# Patient Record
Sex: MALE | Race: WHITE | NOT HISPANIC OR LATINO | Employment: FULL TIME | URBAN - METROPOLITAN AREA
[De-identification: names, ages, dates, MRNs, and addresses within clinical notes are randomized per-mention and may not be internally consistent; named-entity substitution may affect disease eponyms.]

---

## 2019-03-07 ENCOUNTER — OFFICE VISIT (OUTPATIENT)
Dept: GASTROENTEROLOGY | Facility: CLINIC | Age: 29
End: 2019-03-07
Payer: COMMERCIAL

## 2019-03-07 VITALS
HEART RATE: 72 BPM | BODY MASS INDEX: 30.81 KG/M2 | DIASTOLIC BLOOD PRESSURE: 82 MMHG | WEIGHT: 208 LBS | SYSTOLIC BLOOD PRESSURE: 126 MMHG | HEIGHT: 69 IN

## 2019-03-07 DIAGNOSIS — R10.13 EPIGASTRIC PAIN: ICD-10-CM

## 2019-03-07 DIAGNOSIS — K21.9 GASTROESOPHAGEAL REFLUX DISEASE, ESOPHAGITIS PRESENCE NOT SPECIFIED: Primary | ICD-10-CM

## 2019-03-07 DIAGNOSIS — R00.2 PALPITATIONS: ICD-10-CM

## 2019-03-07 PROBLEM — N20.0 KIDNEY STONES: Status: ACTIVE | Noted: 2019-03-07

## 2019-03-07 PROCEDURE — 99243 OFF/OP CNSLTJ NEW/EST LOW 30: CPT | Performed by: INTERNAL MEDICINE

## 2019-03-07 RX ORDER — FAMOTIDINE 10 MG
10 TABLET ORAL AS NEEDED
COMMUNITY
End: 2020-10-12

## 2019-03-07 RX ORDER — PANTOPRAZOLE SODIUM 40 MG/1
40 TABLET, DELAYED RELEASE ORAL DAILY
Qty: 30 TABLET | Refills: 12 | Status: SHIPPED | OUTPATIENT
Start: 2019-03-07 | End: 2020-10-12

## 2019-03-07 NOTE — LETTER
March 7, 2019     Jun Olivo MD  [address] [address]  [address]    Patient: Margo Nunez   YOB: 1990   Date of Visit: 3/7/2019       Dear Dr Peres Stage:    Thank you for referring Margo Nunez to me for evaluation  Below are my notes for this consultation  If you have questions, please do not hesitate to call me  I look forward to following your patient along with you  Sincerely,        Saturnino Viramontes MD        CC: No Recipients  Saturnino Viramontes MD  3/7/2019  5:54 PM  Sign at close encounter    [facility] - Outpatient Consultation  Margo Nunez 25 y o  male MRN: [id number]  Encounter: [id number]    ASSESSMENT AND PLAN:      1  Gastroesophageal reflux disease, esophagitis presence not specified  Patient with longstanding symptoms of fairly classic gastroesophageal reflux  He has feeling of burning sensation coming up from the subxiphoid to the chest   This has been going on quite some time but much worse in the last year so  He does not have dysphagia or alarm symptoms    - pantoprazole (PROTONIX) 40 mg tablet; Take 1 tablet (40 mg total) by mouth daily for 30 days  Dispense: 30 tablet; Refill: 12  --anti reflux diet  --EGD at [facility]   -will do this to rule out Quezada's esophagus    2  Palpitations  -patient has seen cardiology recently  His primary physician thought perhaps this was related to the reflux  In any event recent cardiac workup was negative he had a negative echocardiogram    3  Epigastric pain  Patient does report intermittent left upper quadrant pain and epigastric pain  This seems to be related to problem 1   It is not aggravated by eating      Followup Appointment[de-identified]  4 months  ______________________________________________________________________    Chief Complaint   Patient presents with    Heartburn    Bloated       HPI:   Margo Nunez is a 25y o  year old male who presents longstanding issues acid indigestion and upper abdominal discomfort  Patient reports that he has had some issues over may knee years but these seem to have escalated in the last year so  He was seen in the office by his personal physician over-the-counter Nexium 22 mg was recommended  While he was on this medication he did not really have much in the way of any problems  When he went off the medication the symptoms returned  He denies any dysphagia  Hector Foster He does have a sense of burning in the upper abdomen and chest several times a week  He also has abdominal bloating and some left upper quadrant discomfort as well  This is not associated with eating necessarily  Spicy foods seem to aggravate the symptoms  Fast foods or greasy foods will do the same  He drinks about 1 cup of coffee per day  He will get partial relief of his GERD like symptoms when he takes over-the-counter Pepcid      Past medical history-kidney stones  Past surgical history-lithotripsy      Social History     Substance and Sexual Activity   Alcohol Use Not Currently     Social History     Substance and Sexual Activity   Drug Use Never     Social History     Tobacco Use   Smoking Status Former Smoker    Last attempt to quit: 2017    Years since quittin 1   Smokeless Tobacco Never Used     Family History   Problem Relation Age of Onset    Hypertension Mother     Diabetes Father     Hypertension Father     Colon cancer Neg Hx     Colon polyps Neg Hx        Meds/Allergies     Current Outpatient Medications:     famotidine (PEPCID) 10 mg tablet    pantoprazole (PROTONIX) 40 mg tablet    Allergies   Allergen Reactions    Sulfa Antibiotics Rash       PHYSICAL EXAM:    Blood pressure 126/82, pulse 72, height 5' 9" (1 753 m), weight 94 3 kg (208 lb)  Body mass index is 30 72 kg/m²  General Appearance: NAD, cooperative, alert  HEENT:  Normocephalic, atraumatic, anicteric      Neck:  Supple, symmetrical, trachea midline  Lungs:  Clear to auscultation bilaterally; no rales, rhonchi or wheezing; respirations unlabored   Heart[de-identified]  Regular rate and rhythm; no murmur, rub, or gallop  Abdomen:  Soft, non-tender, non-distended; normal bowel sounds; no masses, no organomegaly   Rectal:  Deferred   Extremities: No cyanosis, clubbing or edema   Skin:  No jaundice, rashes, or lesions   Lymph nodes: No palpable cervical lymphadenopathy  Psych: Normal affect, good eye contact  Neuro: No gross deficits, AAOx3    Lab Results:     No results found for: LIPASE    Radiology Results:   No results found  REVIEW OF SYSTEMS:    CONSTITUTIONAL: Denies any fever, chills, rigors, and weight loss  HEENT: No earache or tinnitus  Denies hearing loss or visual disturbances  CARDIOVASCULAR: No chest pain positive for palpitation   RESPIRATORY: Denies any cough, hemoptysis, shortness of breath or dyspnea on exertion  GASTROINTESTINAL: As noted in the History of Present Illness  GENITOURINARY: No problems with urination  Denies any hematuria or dysuria  NEUROLOGIC: No dizziness or vertigo, denies headaches  MUSCULOSKELETAL: Denies any muscle or joint pain  SKIN: Denies skin rashes or itching  ENDOCRINE: Denies excessive thirst  Denies intolerance to heat or cold  PSYCHOSOCIAL: Denies depression or anxiety  Denies any recent memory loss

## 2019-03-07 NOTE — PATIENT INSTRUCTIONS
[facility] - Outpatient Consultation  Nina Newton 25 y o  male MRN: [id number]  Encounter: [id number]    ASSESSMENT AND PLAN:      1  Gastroesophageal reflux disease, esophagitis presence not specified  Patient with longstanding symptoms of fairly classic gastroesophageal reflux  He has feeling of burning sensation coming up from the subxiphoid to the chest   This has been going on quite some time but much worse in the last year so  He does not have dysphagia or alarm symptoms    - pantoprazole (PROTONIX) 40 mg tablet; Take 1 tablet (40 mg total) by mouth daily for 30 days  Dispense: 30 tablet; Refill: 12  --anti reflux diet  --EGD at [facility]   -will do this to rule out Quezada's esophagus    2  Palpitations  -patient has seen cardiology recently  His primary physician thought perhaps this was related to the reflux  In any event recent cardiac workup was negative he had a negative echocardiogram    3  Epigastric pain  Patient does report intermittent left upper quadrant pain and epigastric pain  This seems to be related to problem 1   It is not aggravated by eating      Followup Appointment[de-identified]  4 months

## 2019-03-07 NOTE — PROGRESS NOTES
[address] - Outpatient Consultation  Nicola Brandt 25 y o  male MRN: [id number]  Encounter: [id number]    ASSESSMENT AND PLAN:      1  Gastroesophageal reflux disease, esophagitis presence not specified  Patient with longstanding symptoms of fairly classic gastroesophageal reflux  He has feeling of burning sensation coming up from the subxiphoid to the chest   This has been going on quite some time but much worse in the last year so  He does not have dysphagia or alarm symptoms    - pantoprazole (PROTONIX) 40 mg tablet; Take 1 tablet (40 mg total) by mouth daily for 30 days  Dispense: 30 tablet; Refill: 12  --anti reflux diet  --EGD at [address] [place] [address]   -will do this to rule out Quezada's esophagus    2  Palpitations  -patient has seen cardiology recently  His primary physician thought perhaps this was related to the reflux  In any event recent cardiac workup was negative he had a negative echocardiogram    3  Epigastric pain  Patient does report intermittent left upper quadrant pain and epigastric pain  This seems to be related to problem 1  It is not aggravated by eating      Followup Appointment[de-identified]  4 months  ______________________________________________________________________    Chief Complaint   Patient presents with    Heartburn    Bloated       HPI:   Nicola Brandt is a 25y o  year old male who presents longstanding issues acid indigestion and upper abdominal discomfort  Patient reports that he has had some issues over may knee years but these seem to have escalated in the last year so  He was seen in the office by his personal physician over-the-counter Nexium 22 mg was recommended  While he was on this medication he did not really have much in the way of any problems  When he went off the medication the symptoms returned  He denies any dysphagia  Banegas Alvarado He does have a sense of burning in the upper abdomen and chest several times a week    He also has abdominal bloating and some left upper quadrant discomfort as well  This is not associated with eating necessarily  Spicy foods seem to aggravate the symptoms  Fast foods or greasy foods will do the same  He drinks about 1 cup of coffee per day  He will get partial relief of his GERD like symptoms when he takes over-the-counter Pepcid      Past medical history-kidney stones  Past surgical history-lithotripsy      Social History     Substance and Sexual Activity   Alcohol Use Not Currently     Social History     Substance and Sexual Activity   Drug Use Never     Social History     Tobacco Use   Smoking Status Former Smoker    Last attempt to quit:     Years since quittin 1   Smokeless Tobacco Never Used     Family History   Problem Relation Age of Onset    Hypertension Mother     Diabetes Father     Hypertension Father     Colon cancer Neg Hx     Colon polyps Neg Hx        Meds/Allergies     Current Outpatient Medications:     famotidine (PEPCID) 10 mg tablet    pantoprazole (PROTONIX) 40 mg tablet    Allergies   Allergen Reactions    Sulfa Antibiotics Rash       PHYSICAL EXAM:    Blood pressure 126/82, pulse 72, height 5' 9" (1 753 m), weight 94 3 kg (208 lb)  Body mass index is 30 72 kg/m²  General Appearance: NAD, cooperative, alert  HEENT:  Normocephalic, atraumatic, anicteric  Neck:  Supple, symmetrical, trachea midline  Lungs:  Clear to auscultation bilaterally; no rales, rhonchi or wheezing; respirations unlabored   Heart[de-identified]  Regular rate and rhythm; no murmur, rub, or gallop    Abdomen:  Soft, non-tender, non-distended; normal bowel sounds; no masses, no organomegaly   Rectal:  Deferred   Extremities: No cyanosis, clubbing or edema   Skin:  No jaundice, rashes, or lesions   Lymph nodes: No palpable cervical lymphadenopathy  Psych: Normal affect, good eye contact  Neuro: No gross deficits, AAOx3    Lab Results:     No results found for: LIPASE    Radiology Results:   No results found     REVIEW OF SYSTEMS:    CONSTITUTIONAL: Denies any fever, chills, rigors, and weight loss  HEENT: No earache or tinnitus  Denies hearing loss or visual disturbances  CARDIOVASCULAR: No chest pain positive for palpitation   RESPIRATORY: Denies any cough, hemoptysis, shortness of breath or dyspnea on exertion  GASTROINTESTINAL: As noted in the History of Present Illness  GENITOURINARY: No problems with urination  Denies any hematuria or dysuria  NEUROLOGIC: No dizziness or vertigo, denies headaches  MUSCULOSKELETAL: Denies any muscle or joint pain  SKIN: Denies skin rashes or itching  ENDOCRINE: Denies excessive thirst  Denies intolerance to heat or cold  PSYCHOSOCIAL: Denies depression or anxiety  Denies any recent memory loss

## 2019-03-12 ENCOUNTER — TELEPHONE (OUTPATIENT)
Dept: GASTROENTEROLOGY | Facility: CLINIC | Age: 29
End: 2019-03-12

## 2019-04-02 NOTE — TELEPHONE ENCOUNTER
Dr Elizabeth Zuniga has been contacted to schedule egd from office visit with no response   Please advise-thank you

## 2019-12-05 ENCOUNTER — OFFICE VISIT (OUTPATIENT)
Dept: URGENT CARE | Facility: CLINIC | Age: 29
End: 2019-12-05
Payer: COMMERCIAL

## 2019-12-05 VITALS
HEIGHT: 69 IN | SYSTOLIC BLOOD PRESSURE: 130 MMHG | OXYGEN SATURATION: 98 % | WEIGHT: 201 LBS | HEART RATE: 107 BPM | DIASTOLIC BLOOD PRESSURE: 92 MMHG | RESPIRATION RATE: 18 BRPM | TEMPERATURE: 101.6 F | BODY MASS INDEX: 29.77 KG/M2

## 2019-12-05 DIAGNOSIS — J11.1 INFLUENZA: Primary | ICD-10-CM

## 2019-12-05 PROCEDURE — 99213 OFFICE O/P EST LOW 20 MIN: CPT | Performed by: FAMILY MEDICINE

## 2019-12-05 RX ORDER — OSELTAMIVIR PHOSPHATE 75 MG/1
75 CAPSULE ORAL EVERY 12 HOURS SCHEDULED
Qty: 10 CAPSULE | Refills: 0 | Status: SHIPPED | OUTPATIENT
Start: 2019-12-05 | End: 2019-12-10

## 2019-12-05 RX ORDER — SACCHAROMYCES BOULARDII 250 MG
250 CAPSULE ORAL 2 TIMES DAILY
COMMUNITY
End: 2020-10-15

## 2019-12-05 NOTE — PATIENT INSTRUCTIONS
F/u as needed  Most likely viral   Supportive care  OTC meds as needed  Tamiflu dispensed  F/u with PCP if no improvement

## 2019-12-05 NOTE — LETTER
December 5, 2019     Patient: Tomeka Byrne   YOB: 1990   Date of Visit: 12/5/2019       To Whom it May Concern: Tomeka Byrne was seen in my clinic on 12/5/2019  He is excused from work 12/5-6/2019 due to illness  If you have any questions or concerns, please don't hesitate to call           Sincerely,          MARYCRUZ Funez        CC: No Recipients

## 2020-03-11 ENCOUNTER — TRANSCRIBE ORDERS (OUTPATIENT)
Dept: ADMINISTRATIVE | Facility: HOSPITAL | Age: 30
End: 2020-03-11

## 2020-03-11 DIAGNOSIS — R10.9 ABDOMINAL PAIN, UNSPECIFIED ABDOMINAL LOCATION: Primary | ICD-10-CM

## 2020-03-17 ENCOUNTER — HOSPITAL ENCOUNTER (OUTPATIENT)
Dept: ULTRASOUND IMAGING | Facility: HOSPITAL | Age: 30
Discharge: HOME | End: 2020-03-17
Payer: COMMERCIAL

## 2020-03-17 DIAGNOSIS — R10.9 ABDOMINAL PAIN, UNSPECIFIED ABDOMINAL LOCATION: ICD-10-CM

## 2020-03-17 PROCEDURE — 76857 US EXAM PELVIC LIMITED: CPT

## 2020-03-17 PROCEDURE — 76700 US EXAM ABDOM COMPLETE: CPT

## 2020-10-06 ENCOUNTER — OFFICE VISIT (OUTPATIENT)
Dept: URGENT CARE | Facility: CLINIC | Age: 30
End: 2020-10-06
Payer: COMMERCIAL

## 2020-10-06 VITALS
WEIGHT: 200 LBS | BODY MASS INDEX: 29.62 KG/M2 | RESPIRATION RATE: 16 BRPM | DIASTOLIC BLOOD PRESSURE: 91 MMHG | HEART RATE: 69 BPM | SYSTOLIC BLOOD PRESSURE: 157 MMHG | TEMPERATURE: 98.9 F | OXYGEN SATURATION: 99 % | HEIGHT: 69 IN

## 2020-10-06 DIAGNOSIS — K29.00 ACUTE GASTRITIS WITHOUT HEMORRHAGE, UNSPECIFIED GASTRITIS TYPE: Primary | ICD-10-CM

## 2020-10-06 PROCEDURE — 99213 OFFICE O/P EST LOW 20 MIN: CPT

## 2020-10-06 RX ORDER — OMEPRAZOLE 20 MG/1
20 CAPSULE, DELAYED RELEASE ORAL DAILY
COMMUNITY
End: 2020-10-21 | Stop reason: SDUPTHER

## 2020-10-06 RX ORDER — CALCIUM CARBONATE 200(500)MG
1 TABLET,CHEWABLE ORAL 3 TIMES DAILY
Qty: 30 TABLET | Refills: 0 | Status: SHIPPED | OUTPATIENT
Start: 2020-10-06 | End: 2020-10-15

## 2020-10-12 ENCOUNTER — HOSPITAL ENCOUNTER (EMERGENCY)
Facility: HOSPITAL | Age: 30
Discharge: HOME | End: 2020-10-12
Attending: EMERGENCY MEDICINE | Admitting: EMERGENCY MEDICINE
Payer: COMMERCIAL

## 2020-10-12 ENCOUNTER — APPOINTMENT (EMERGENCY)
Dept: CT IMAGING | Facility: HOSPITAL | Age: 30
End: 2020-10-12
Payer: COMMERCIAL

## 2020-10-12 ENCOUNTER — APPOINTMENT (EMERGENCY)
Dept: RADIOLOGY | Facility: HOSPITAL | Age: 30
End: 2020-10-12
Payer: COMMERCIAL

## 2020-10-12 VITALS
SYSTOLIC BLOOD PRESSURE: 130 MMHG | WEIGHT: 195 LBS | OXYGEN SATURATION: 97 % | DIASTOLIC BLOOD PRESSURE: 94 MMHG | TEMPERATURE: 97.7 F | HEART RATE: 65 BPM | BODY MASS INDEX: 28.88 KG/M2 | RESPIRATION RATE: 14 BRPM | HEIGHT: 69 IN

## 2020-10-12 DIAGNOSIS — M54.2 NECK PAIN: ICD-10-CM

## 2020-10-12 DIAGNOSIS — R07.9 CHEST PAIN: Primary | ICD-10-CM

## 2020-10-12 DIAGNOSIS — R51.9 HEADACHE: ICD-10-CM

## 2020-10-12 LAB
ALBUMIN SERPL BCP-MCNC: 4.2 G/DL (ref 3.5–5)
ALP SERPL-CCNC: 70 U/L (ref 46–116)
ALT SERPL W P-5'-P-CCNC: 28 U/L (ref 12–78)
ANION GAP SERPL CALCULATED.3IONS-SCNC: 6 MMOL/L (ref 4–13)
AST SERPL W P-5'-P-CCNC: 17 U/L (ref 5–45)
BASOPHILS # BLD AUTO: 0.03 THOUSANDS/ΜL (ref 0–0.1)
BASOPHILS NFR BLD AUTO: 0 % (ref 0–1)
BILIRUB SERPL-MCNC: 0.4 MG/DL (ref 0.2–1)
BUN SERPL-MCNC: 15 MG/DL (ref 5–25)
CALCIUM SERPL-MCNC: 9 MG/DL (ref 8.3–10.1)
CHLORIDE SERPL-SCNC: 102 MMOL/L (ref 100–108)
CO2 SERPL-SCNC: 29 MMOL/L (ref 21–32)
CREAT SERPL-MCNC: 1.17 MG/DL (ref 0.6–1.3)
EOSINOPHIL # BLD AUTO: 0.04 THOUSAND/ΜL (ref 0–0.61)
EOSINOPHIL NFR BLD AUTO: 1 % (ref 0–6)
ERYTHROCYTE [DISTWIDTH] IN BLOOD BY AUTOMATED COUNT: 11.7 % (ref 11.6–15.1)
GFR SERPL CREATININE-BSD FRML MDRD: 82 ML/MIN/1.73SQ M
GLUCOSE SERPL-MCNC: 88 MG/DL (ref 65–140)
HCT VFR BLD AUTO: 44.3 % (ref 36.5–49.3)
HGB BLD-MCNC: 15.5 G/DL (ref 12–17)
IMM GRANULOCYTES # BLD AUTO: 0.01 THOUSAND/UL (ref 0–0.2)
IMM GRANULOCYTES NFR BLD AUTO: 0 % (ref 0–2)
LIPASE SERPL-CCNC: 148 U/L (ref 73–393)
LYMPHOCYTES # BLD AUTO: 2.85 THOUSANDS/ΜL (ref 0.6–4.47)
LYMPHOCYTES NFR BLD AUTO: 33 % (ref 14–44)
MCH RBC QN AUTO: 31.1 PG (ref 26.8–34.3)
MCHC RBC AUTO-ENTMCNC: 35 G/DL (ref 31.4–37.4)
MCV RBC AUTO: 89 FL (ref 82–98)
MONOCYTES # BLD AUTO: 0.71 THOUSAND/ΜL (ref 0.17–1.22)
MONOCYTES NFR BLD AUTO: 8 % (ref 4–12)
NEUTROPHILS # BLD AUTO: 5.12 THOUSANDS/ΜL (ref 1.85–7.62)
NEUTS SEG NFR BLD AUTO: 58 % (ref 43–75)
NRBC BLD AUTO-RTO: 0 /100 WBCS
PLATELET # BLD AUTO: 263 THOUSANDS/UL (ref 149–390)
PMV BLD AUTO: 9.6 FL (ref 8.9–12.7)
POTASSIUM SERPL-SCNC: 4 MMOL/L (ref 3.5–5.3)
PROT SERPL-MCNC: 8.2 G/DL (ref 6.4–8.2)
RBC # BLD AUTO: 4.98 MILLION/UL (ref 3.88–5.62)
SODIUM SERPL-SCNC: 137 MMOL/L (ref 136–145)
TROPONIN I SERPL-MCNC: <0.02 NG/ML
WBC # BLD AUTO: 8.76 THOUSAND/UL (ref 4.31–10.16)

## 2020-10-12 PROCEDURE — 99285 EMERGENCY DEPT VISIT HI MDM: CPT | Performed by: PHYSICIAN ASSISTANT

## 2020-10-12 PROCEDURE — 84484 ASSAY OF TROPONIN QUANT: CPT

## 2020-10-12 PROCEDURE — 36415 COLL VENOUS BLD VENIPUNCTURE: CPT

## 2020-10-12 PROCEDURE — 85025 COMPLETE CBC W/AUTO DIFF WBC: CPT

## 2020-10-12 PROCEDURE — 80053 COMPREHEN METABOLIC PANEL: CPT

## 2020-10-12 PROCEDURE — 93005 ELECTROCARDIOGRAM TRACING: CPT

## 2020-10-12 PROCEDURE — 72125 CT NECK SPINE W/O DYE: CPT

## 2020-10-12 PROCEDURE — 71045 X-RAY EXAM CHEST 1 VIEW: CPT

## 2020-10-12 PROCEDURE — 83690 ASSAY OF LIPASE: CPT | Performed by: PHYSICIAN ASSISTANT

## 2020-10-12 PROCEDURE — G1004 CDSM NDSC: HCPCS

## 2020-10-12 PROCEDURE — 99285 EMERGENCY DEPT VISIT HI MDM: CPT

## 2020-10-12 RX ORDER — METHOCARBAMOL 500 MG/1
500 TABLET, FILM COATED ORAL ONCE
Status: COMPLETED | OUTPATIENT
Start: 2020-10-12 | End: 2020-10-12

## 2020-10-12 RX ORDER — METHOCARBAMOL 500 MG/1
500 TABLET, FILM COATED ORAL 3 TIMES DAILY
Qty: 20 TABLET | Refills: 0 | Status: SHIPPED | OUTPATIENT
Start: 2020-10-12 | End: 2020-10-15

## 2020-10-12 RX ADMIN — METHOCARBAMOL 500 MG: 500 TABLET ORAL at 16:46

## 2020-10-13 LAB
ATRIAL RATE: 64 BPM
P AXIS: 47 DEGREES
PR INTERVAL: 142 MS
QRS AXIS: 46 DEGREES
QRSD INTERVAL: 98 MS
QT INTERVAL: 390 MS
QTC INTERVAL: 402 MS
T WAVE AXIS: 36 DEGREES
VENTRICULAR RATE: 64 BPM

## 2020-10-13 PROCEDURE — 93010 ELECTROCARDIOGRAM REPORT: CPT | Performed by: INTERNAL MEDICINE

## 2020-10-15 ENCOUNTER — TELEPHONE (OUTPATIENT)
Dept: GASTROENTEROLOGY | Facility: CLINIC | Age: 30
End: 2020-10-15

## 2020-10-15 ENCOUNTER — OFFICE VISIT (OUTPATIENT)
Dept: GASTROENTEROLOGY | Facility: CLINIC | Age: 30
End: 2020-10-15
Payer: COMMERCIAL

## 2020-10-15 VITALS
HEART RATE: 78 BPM | WEIGHT: 195 LBS | HEIGHT: 69 IN | SYSTOLIC BLOOD PRESSURE: 162 MMHG | DIASTOLIC BLOOD PRESSURE: 102 MMHG | BODY MASS INDEX: 28.88 KG/M2 | TEMPERATURE: 98 F

## 2020-10-15 DIAGNOSIS — R14.0 ABDOMINAL BLOATING: ICD-10-CM

## 2020-10-15 DIAGNOSIS — R10.13 EPIGASTRIC PAIN: Primary | ICD-10-CM

## 2020-10-15 DIAGNOSIS — K82.4 GALLBLADDER POLYP: ICD-10-CM

## 2020-10-15 PROCEDURE — 99214 OFFICE O/P EST MOD 30 MIN: CPT | Performed by: INTERNAL MEDICINE

## 2020-10-15 RX ORDER — AMOXICILLIN 500 MG
CAPSULE ORAL
COMMUNITY

## 2020-10-21 ENCOUNTER — ANESTHESIA EVENT (OUTPATIENT)
Dept: GASTROENTEROLOGY | Facility: AMBULATORY SURGERY CENTER | Age: 30
End: 2020-10-21

## 2020-10-21 ENCOUNTER — ANESTHESIA (OUTPATIENT)
Dept: GASTROENTEROLOGY | Facility: AMBULATORY SURGERY CENTER | Age: 30
End: 2020-10-21

## 2020-10-21 ENCOUNTER — HOSPITAL ENCOUNTER (OUTPATIENT)
Dept: GASTROENTEROLOGY | Facility: AMBULATORY SURGERY CENTER | Age: 30
Discharge: HOME | End: 2020-10-21
Payer: COMMERCIAL

## 2020-10-21 VITALS
OXYGEN SATURATION: 97 % | RESPIRATION RATE: 24 BRPM | HEART RATE: 70 BPM | SYSTOLIC BLOOD PRESSURE: 126 MMHG | DIASTOLIC BLOOD PRESSURE: 85 MMHG | TEMPERATURE: 97.7 F

## 2020-10-21 VITALS — HEART RATE: 99 BPM

## 2020-10-21 DIAGNOSIS — R14.0 ABDOMINAL BLOATING: ICD-10-CM

## 2020-10-21 DIAGNOSIS — R10.13 EPIGASTRIC PAIN: ICD-10-CM

## 2020-10-21 DIAGNOSIS — K29.60 EROSIVE GASTRITIS: Primary | ICD-10-CM

## 2020-10-21 PROCEDURE — 88305 TISSUE EXAM BY PATHOLOGIST: CPT | Performed by: PATHOLOGY

## 2020-10-21 PROCEDURE — 43239 EGD BIOPSY SINGLE/MULTIPLE: CPT | Performed by: INTERNAL MEDICINE

## 2020-10-21 RX ORDER — SODIUM CHLORIDE 9 MG/ML
50 INJECTION, SOLUTION INTRAVENOUS CONTINUOUS
Status: DISCONTINUED | OUTPATIENT
Start: 2020-10-21 | End: 2020-10-21

## 2020-10-21 RX ORDER — PROPOFOL 10 MG/ML
INJECTION, EMULSION INTRAVENOUS AS NEEDED
Status: DISCONTINUED | OUTPATIENT
Start: 2020-10-21 | End: 2020-10-21

## 2020-10-21 RX ORDER — OMEPRAZOLE 40 MG/1
40 CAPSULE, DELAYED RELEASE ORAL DAILY
Qty: 30 CAPSULE | Refills: 2 | Status: SHIPPED | OUTPATIENT
Start: 2020-10-21 | End: 2021-03-23

## 2020-10-21 RX ADMIN — PROPOFOL 50 MG: 10 INJECTION, EMULSION INTRAVENOUS at 12:43

## 2020-10-21 RX ADMIN — PROPOFOL 150 MG: 10 INJECTION, EMULSION INTRAVENOUS at 12:56

## 2020-10-21 RX ADMIN — PROPOFOL 100 MG: 10 INJECTION, EMULSION INTRAVENOUS at 12:59

## 2020-10-21 RX ADMIN — SODIUM CHLORIDE 50 ML/HR: 9 INJECTION, SOLUTION INTRAVENOUS at 12:47

## 2020-10-22 ENCOUNTER — TELEPHONE (OUTPATIENT)
Dept: GASTROENTEROLOGY | Facility: CLINIC | Age: 30
End: 2020-10-22

## 2020-11-29 ENCOUNTER — NURSE TRIAGE (OUTPATIENT)
Dept: OTHER | Facility: OTHER | Age: 30
End: 2020-11-29

## 2020-11-29 DIAGNOSIS — Z20.828 EXPOSURE TO SARS-ASSOCIATED CORONAVIRUS: Primary | ICD-10-CM

## 2020-11-30 DIAGNOSIS — Z20.828 EXPOSURE TO SARS-ASSOCIATED CORONAVIRUS: ICD-10-CM

## 2020-11-30 PROCEDURE — U0003 INFECTIOUS AGENT DETECTION BY NUCLEIC ACID (DNA OR RNA); SEVERE ACUTE RESPIRATORY SYNDROME CORONAVIRUS 2 (SARS-COV-2) (CORONAVIRUS DISEASE [COVID-19]), AMPLIFIED PROBE TECHNIQUE, MAKING USE OF HIGH THROUGHPUT TECHNOLOGIES AS DESCRIBED BY CMS-2020-01-R: HCPCS | Performed by: FAMILY MEDICINE

## 2020-12-01 LAB — SARS-COV-2 RNA SPEC QL NAA+PROBE: NOT DETECTED

## 2020-12-09 ENCOUNTER — OFFICE VISIT (OUTPATIENT)
Dept: GASTROENTEROLOGY | Facility: CLINIC | Age: 30
End: 2020-12-09
Payer: COMMERCIAL

## 2020-12-09 VITALS
WEIGHT: 199 LBS | SYSTOLIC BLOOD PRESSURE: 124 MMHG | HEIGHT: 69 IN | DIASTOLIC BLOOD PRESSURE: 82 MMHG | BODY MASS INDEX: 29.47 KG/M2

## 2020-12-09 DIAGNOSIS — K82.4 GALLBLADDER POLYP: ICD-10-CM

## 2020-12-09 DIAGNOSIS — K21.9 GASTROESOPHAGEAL REFLUX DISEASE WITHOUT ESOPHAGITIS: Primary | ICD-10-CM

## 2020-12-09 PROCEDURE — 99214 OFFICE O/P EST MOD 30 MIN: CPT | Performed by: INTERNAL MEDICINE

## 2020-12-09 RX ORDER — BUSPIRONE HYDROCHLORIDE 10 MG/1
10 TABLET ORAL 2 TIMES DAILY
COMMUNITY
Start: 2020-11-24

## 2020-12-09 RX ORDER — OMEPRAZOLE 20 MG/1
20 CAPSULE, DELAYED RELEASE ORAL DAILY
Qty: 30 CAPSULE | Refills: 2 | Status: SHIPPED | OUTPATIENT
Start: 2020-12-09 | End: 2021-03-23

## 2021-03-21 DIAGNOSIS — K21.9 GASTROESOPHAGEAL REFLUX DISEASE WITHOUT ESOPHAGITIS: ICD-10-CM

## 2021-03-23 RX ORDER — OMEPRAZOLE 20 MG/1
CAPSULE, DELAYED RELEASE ORAL
Qty: 30 CAPSULE | Refills: 2 | Status: SHIPPED | OUTPATIENT
Start: 2021-03-23 | End: 2021-06-23

## 2021-06-21 DIAGNOSIS — K21.9 GASTROESOPHAGEAL REFLUX DISEASE WITHOUT ESOPHAGITIS: ICD-10-CM

## 2021-06-23 RX ORDER — OMEPRAZOLE 20 MG/1
CAPSULE, DELAYED RELEASE ORAL
Qty: 30 CAPSULE | Refills: 2 | Status: SHIPPED | OUTPATIENT
Start: 2021-06-23

## 2021-06-23 NOTE — TELEPHONE ENCOUNTER
Last OV           12/09/2020  GS  Recommended F/-  to complete US March 2021 and f/u to be determined after that    Last refill      3/23/2021    30 day with 2 refills      I did speak to patient about [address] and he said due to work he had not been able to complete the [address]  He states he will call back when he can get that done  Advised would refill PPI at this time but needs to get US done to continue refilling

## 2023-03-31 ENCOUNTER — APPOINTMENT (OUTPATIENT)
Dept: RADIOLOGY | Facility: HOSPITAL | Age: 33
End: 2023-03-31

## 2023-03-31 ENCOUNTER — HOSPITAL ENCOUNTER (EMERGENCY)
Facility: HOSPITAL | Age: 33
Discharge: HOME | End: 2023-03-31
Attending: EMERGENCY MEDICINE

## 2023-03-31 VITALS
RESPIRATION RATE: 21 BRPM | HEART RATE: 69 BPM | SYSTOLIC BLOOD PRESSURE: 130 MMHG | HEIGHT: 69 IN | WEIGHT: 198 LBS | DIASTOLIC BLOOD PRESSURE: 79 MMHG | OXYGEN SATURATION: 97 % | TEMPERATURE: 98.7 F | BODY MASS INDEX: 29.33 KG/M2

## 2023-03-31 DIAGNOSIS — R07.9 CHEST PAIN WITH LOW RISK OF ACUTE CORONARY SYNDROME: Primary | ICD-10-CM

## 2023-03-31 LAB
ALBUMIN SERPL BCP-MCNC: 4.4 G/DL (ref 3.5–5)
ALP SERPL-CCNC: 58 U/L (ref 34–104)
ALT SERPL W P-5'-P-CCNC: 25 U/L (ref 7–52)
ANION GAP SERPL CALCULATED.3IONS-SCNC: 8 MMOL/L (ref 4–13)
AST SERPL W P-5'-P-CCNC: 17 U/L (ref 13–39)
ATRIAL RATE: 75 BPM
BASOPHILS # BLD AUTO: 0.03 THOUSANDS/ÂΜL (ref 0–0.1)
BASOPHILS NFR BLD AUTO: 0 % (ref 0–1)
BILIRUB SERPL-MCNC: 0.47 MG/DL (ref 0.2–1)
BUN SERPL-MCNC: 16 MG/DL (ref 5–25)
CALCIUM SERPL-MCNC: 9.7 MG/DL (ref 8.4–10.2)
CARDIAC TROPONIN I PNL SERPL HS: <2 NG/L
CHLORIDE SERPL-SCNC: 102 MMOL/L (ref 96–108)
CO2 SERPL-SCNC: 28 MMOL/L (ref 21–32)
CREAT SERPL-MCNC: 1.14 MG/DL (ref 0.6–1.3)
EOSINOPHIL # BLD AUTO: 0.07 THOUSAND/ÂΜL (ref 0–0.61)
EOSINOPHIL NFR BLD AUTO: 1 % (ref 0–6)
ERYTHROCYTE [DISTWIDTH] IN BLOOD BY AUTOMATED COUNT: 11.7 % (ref 11.6–15.1)
GFR SERPL CREATININE-BSD FRML MDRD: 83 ML/MIN/1.73SQ M
GLUCOSE SERPL-MCNC: 107 MG/DL (ref 65–140)
HCT VFR BLD AUTO: 44 % (ref 36.5–49.3)
HGB BLD-MCNC: 15 G/DL (ref 12–17)
IMM GRANULOCYTES # BLD AUTO: 0.02 THOUSAND/UL (ref 0–0.2)
IMM GRANULOCYTES NFR BLD AUTO: 0 % (ref 0–2)
LYMPHOCYTES # BLD AUTO: 3.83 THOUSANDS/ÂΜL (ref 0.6–4.47)
LYMPHOCYTES NFR BLD AUTO: 43 % (ref 14–44)
MCH RBC QN AUTO: 30.6 PG (ref 26.8–34.3)
MCHC RBC AUTO-ENTMCNC: 34.1 G/DL (ref 31.4–37.4)
MCV RBC AUTO: 90 FL (ref 82–98)
MONOCYTES # BLD AUTO: 0.73 THOUSAND/ÂΜL (ref 0.17–1.22)
MONOCYTES NFR BLD AUTO: 8 % (ref 4–12)
NEUTROPHILS # BLD AUTO: 4.14 THOUSANDS/ÂΜL (ref 1.85–7.62)
NEUTS SEG NFR BLD AUTO: 48 % (ref 43–75)
NRBC BLD AUTO-RTO: 0 /100 WBCS
P AXIS: 52 DEGREES
PLATELET # BLD AUTO: 252 THOUSANDS/UL (ref 149–390)
PMV BLD AUTO: 9.7 FL (ref 8.9–12.7)
POTASSIUM SERPL-SCNC: 3.6 MMOL/L (ref 3.5–5.3)
PR INTERVAL: 160 MS
PROT SERPL-MCNC: 7.7 G/DL (ref 6.4–8.4)
QRS AXIS: 38 DEGREES
QRSD INTERVAL: 88 MS
QT INTERVAL: 352 MS
QTC INTERVAL: 393 MS
RBC # BLD AUTO: 4.9 MILLION/UL (ref 3.88–5.62)
SODIUM SERPL-SCNC: 138 MMOL/L (ref 135–147)
T WAVE AXIS: 33 DEGREES
VENTRICULAR RATE: 75 BPM
WBC # BLD AUTO: 8.82 THOUSAND/UL (ref 4.31–10.16)

## 2023-03-31 NOTE — ED PROVIDER NOTES
History  Chief Complaint   Patient presents with   • Chest Pain     28 yo M with PMH Of kidney stones, gerd, and prior anxiety attacks presents to ED with CP  Since resolved  Woke him from sleep  He has had that before with his anxiety, normally he just goes back to sleep  Tonight however, he noticed spasms in his arms and legs and that scared him so he came in  Resolved on arrival  Lasted about 10 mins  No focal numbness/tingling/weakness  Has been in normal state of health, no infectious sx or fever  No sob or cough  No GI/ complaints  No leg pain/swelling  No smoking/drinking/drugs  No travel/procedures  No h/o PE/DVT  No family h/o SCD  No stimulants  History provided by:  Patient and medical records   used: No    Chest Pain  Pain location:  Substernal area  Pain quality: tightness    Pain radiates to:  Does not radiate  Pain radiates to the back: no    Pain severity:  Mild  Onset quality:  Sudden  Timing:  Constant  Progression:  Resolved  Chronicity:  Recurrent  Context: at rest    Relieved by:  Nothing  Worsened by:  Nothing tried  Ineffective treatments:  None tried  Associated symptoms: no abdominal pain, no altered mental status, no anorexia, no back pain, no cough, no diaphoresis, no dizziness, no dysphagia, no fatigue, no fever, no headache, no heartburn, no lower extremity edema, no nausea, no near-syncope, no numbness, no orthopnea, no palpitations, no PND, no shortness of breath, no syncope, not vomiting and no weakness    Risk factors: male sex    Risk factors: no aortic disease, no birth control, no coronary artery disease, no diabetes mellitus, no Audrey-Danlos syndrome, no high cholesterol, no hypertension, no immobilization, no Marfan's syndrome, not obese, not pregnant, no prior DVT/PE, no smoking and no surgery        Prior to Admission Medications   Prescriptions Last Dose Informant Patient Reported? Taking?    Omega-3 Fatty Acids (Fish Oil) 1200 MG CAPS   Yes Yes Sig: Take by mouth   Probiotic Product (ALIGN PO)   Yes No   Sig: Take by mouth daily   busPIRone (BUSPAR) 10 mg tablet   Yes No   Sig: Take 10 mg by mouth 2 (two) times a day   omeprazole (PriLOSEC) 20 mg delayed release capsule   No Yes   Sig: TAKE ONE CAPSULE BY MOUTH EVERY DAY      Facility-Administered Medications: None       Past Medical History:   Diagnosis Date   • GERD (gastroesophageal reflux disease)    • Kidney stones     Multiple episodes   • Palpitations        Past Surgical History:   Procedure Laterality Date   • CYSTOSCOPY W/ URETEROSCOPY W/ LITHOTRIPSY     • PILONIDAL CYST EXCISION         Family History   Problem Relation Age of Onset   • Hypertension Mother    • Gallbladder disease Mother    • Diabetes Father    • Hypertension Father    • Gallbladder disease Maternal Grandmother    • Gallbladder disease Maternal Aunt    • Colon cancer Neg Hx    • Colon polyps Neg Hx      I have reviewed and agree with the history as documented  E-Cigarette/Vaping   • E-Cigarette Use Former User      E-Cigarette/Vaping Substances   • Nicotine Yes    • THC No    • CBD No    • Flavoring No    • Other No    • Unknown No      Social History     Tobacco Use   • Smoking status: Former     Types: Cigarettes     Quit date:      Years since quittin 2   • Smokeless tobacco: Never   Vaping Use   • Vaping Use: Former   • Substances: Nicotine   Substance Use Topics   • Alcohol use: Not Currently   • Drug use: Never       Review of Systems   Constitutional: Negative for chills, diaphoresis, fatigue, fever and unexpected weight change  HENT: Negative for congestion, ear pain, rhinorrhea, sore throat, trouble swallowing and voice change  Eyes: Negative for pain and visual disturbance  Respiratory: Negative for cough, chest tightness and shortness of breath  Cardiovascular: Positive for chest pain  Negative for palpitations, orthopnea, leg swelling, syncope, PND and near-syncope     Gastrointestinal: Negative for abdominal pain, anorexia, blood in stool, constipation, diarrhea, heartburn, nausea and vomiting  Genitourinary: Negative for difficulty urinating, dysuria and hematuria  Musculoskeletal: Negative for arthralgias, back pain and neck pain  Skin: Negative for rash  Neurological: Positive for tremors  Negative for dizziness, seizures, syncope, facial asymmetry, speech difficulty, weakness, light-headedness, numbness and headaches  Psychiatric/Behavioral: Negative for confusion and suicidal ideas  The patient is not nervous/anxious  Physical Exam  Physical Exam  Vitals and nursing note reviewed  Constitutional:       General: He is not in acute distress  Appearance: He is well-developed  He is not ill-appearing, toxic-appearing or diaphoretic  HENT:      Head: Normocephalic and atraumatic  Right Ear: External ear normal       Left Ear: External ear normal       Nose: Nose normal    Eyes:      General: No scleral icterus  Right eye: No discharge  Left eye: No discharge  Extraocular Movements: Extraocular movements intact  Conjunctiva/sclera: Conjunctivae normal       Pupils: Pupils are equal, round, and reactive to light  Neck:      Vascular: No JVD  Trachea: No tracheal deviation  Cardiovascular:      Rate and Rhythm: Normal rate and regular rhythm  Heart sounds: Normal heart sounds  Heart sounds not distant  No murmur heard  No friction rub  No gallop  Pulmonary:      Effort: Pulmonary effort is normal  No respiratory distress  Breath sounds: Normal breath sounds  No stridor  No wheezing or rales  Chest:      Chest wall: No tenderness  Abdominal:      General: Bowel sounds are normal  There is no distension  Palpations: Abdomen is soft  Tenderness: There is no abdominal tenderness  There is no guarding or rebound  Musculoskeletal:         General: No tenderness or deformity  Normal range of motion        Cervical back: Normal range of motion and neck supple  Right lower leg: No tenderness  No edema  Left lower leg: No tenderness  No edema  Lymphadenopathy:      Cervical: No cervical adenopathy  Skin:     General: Skin is warm and dry  Findings: No rash  Neurological:      General: No focal deficit present  Mental Status: He is alert and oriented to person, place, and time  Cranial Nerves: No cranial nerve deficit  Sensory: No sensory deficit  Motor: No weakness        Coordination: Coordination normal    Psychiatric:         Behavior: Behavior normal          Vital Signs  ED Triage Vitals   Temperature Pulse Respirations Blood Pressure SpO2   03/31/23 0106 03/31/23 0106 03/31/23 0106 03/31/23 0106 03/31/23 0106   98 7 °F (37 1 °C) 82 16 (!) 150/106 99 %      Temp Source Heart Rate Source Patient Position - Orthostatic VS BP Location FiO2 (%)   03/31/23 0106 03/31/23 0225 03/31/23 0225 03/31/23 0225 --   Temporal Monitor Sitting Left arm       Pain Score       --                  Vitals:    03/31/23 0106 03/31/23 0225 03/31/23 0300   BP: (!) 150/106 146/95 130/79   Pulse: 82 82 65   Patient Position - Orthostatic VS:  Sitting Sitting         Visual Acuity      ED Medications  Medications - No data to display    Diagnostic Studies  Results Reviewed     Procedure Component Value Units Date/Time    HS Troponin 0hr (reflex protocol) [[id number]]  (Normal) Collected: 03/31/23 0115    Lab Status: Final result Specimen: Blood from Arm, Left Updated: 03/31/23 0142     hs TnI 0hr <2 ng/L     Comprehensive metabolic panel [[id number]] Collected: 03/31/23 0115    Lab Status: Final result Specimen: Blood from Arm, Left Updated: 03/31/23 0137     Sodium 138 mmol/L      Potassium 3 6 mmol/L      Chloride 102 mmol/L      CO2 28 mmol/L      ANION GAP 8 mmol/L      BUN 16 mg/dL      Creatinine 1 14 mg/dL      Glucose 107 mg/dL      Calcium 9 7 mg/dL      AST 17 U/L      ALT 25 U/L      Alkaline Phosphatase 58 U/L      Total Protein 7 7 g/dL      Albumin 4 4 g/dL      Total Bilirubin 0 47 mg/dL      eGFR 83 ml/min/1 73sq m     Narrative:      National Kidney Disease Foundation guidelines for Chronic Kidney Disease (CKD):   •  Stage 1 with normal or high GFR (GFR > 90 mL/min/1 73 square meters)  •  Stage 2 Mild CKD (GFR = 60-89 mL/min/1 73 square meters)  •  Stage 3A Moderate CKD (GFR = 45-59 mL/min/1 73 square meters)  •  Stage 3B Moderate CKD (GFR = 30-44 mL/min/1 73 square meters)  •  Stage 4 Severe CKD (GFR = 15-29 mL/min/1 73 square meters)  •  Stage 5 End Stage CKD (GFR <15 mL/min/1 73 square meters)  Note: GFR calculation is accurate only with a steady state creatinine    CBC and differential [[id number]] Collected: 03/31/23 0115    Lab Status: Final result Specimen: Blood from Arm, Left Updated: 03/31/23 0121     WBC 8 82 Thousand/uL      RBC 4 90 Million/uL      Hemoglobin 15 0 g/dL      Hematocrit 44 0 %      MCV 90 fL      MCH 30 6 pg      MCHC 34 1 g/dL      RDW 11 7 %      MPV 9 7 fL      Platelets 910 Thousands/uL      nRBC 0 /100 WBCs      Neutrophils Relative 48 %      Immat GRANS % 0 %      Lymphocytes Relative 43 %      Monocytes Relative 8 %      Eosinophils Relative 1 %      Basophils Relative 0 %      Neutrophils Absolute 4 14 Thousands/µL      Immature Grans Absolute 0 02 Thousand/uL      Lymphocytes Absolute 3 83 Thousands/µL      Monocytes Absolute 0 73 Thousand/µL      Eosinophils Absolute 0 07 Thousand/µL      Basophils Absolute 0 03 Thousands/µL                  XR chest pa & lateral   ED Interpretation by Debbie Carlos MD (03/31 1938)   No acute abnormality   No ptx or consolidation                 Procedures  ECG 12 Lead Documentation Only    Date/Time: 3/31/2023 3:40 AM  Performed by: Debbie Carlos MD  Authorized by: Debbie Carlos MD     Indications / Diagnosis:  Cp  ECG reviewed by me, the ED Provider: yes    Patient location:  ED  Previous ECG:     Previous ECG: Unavailable    Comparison to cardiac monitor: Yes    Interpretation:     Interpretation: normal    Quality:     Tracing quality:  Limited by artifact  Rate:     ECG rate:  75    ECG rate assessment: normal    Rhythm:     Rhythm: sinus rhythm    Ectopy:     Ectopy: none    QRS:     QRS axis:  Normal    QRS intervals:  Normal  Conduction:     Conduction: normal    ST segments:     ST segments:  Normal  T waves:     T waves: normal               ED Course  ED Course as of 03/31/23 0344   Fri Mar 31, 2023   0337 Pt feels back to baseline  Workup benign, incl ekg and cxr (my intepretation)  Pt has had the CP in the past, assoc with anxiety, but the spasms in his arms/legs were new, so that's why he came in  No family h/o scd or cardiac issues at a young age  Sx had all resolved on arrival to ed  Perc 0, low risk HEART score  I do not think pt needs hospitalization at this time, but advised worsening sx = RTED, pt agrees with plan                HEART Risk Score    Flowsheet Row Most Recent Value   Heart Score Risk Calculator    History 0 Filed at: 03/31/2023 0336   ECG 0 Filed at: 03/31/2023 2997   Age 0 Filed at: 03/31/2023 0336   Risk Factors 0 Filed at: 03/31/2023 0336   Troponin 0 Filed at: 03/31/2023 0336   HEART Score 0 Filed at: 03/31/2023 0336                PERC Rule for PE    Flowsheet Row Most Recent Value   PERC Rule for PE    Age >=50 0 Filed at: 03/31/2023 0336   HR >=100 0 Filed at: 03/31/2023 0336   O2 Sat on room air < 95% 0 Filed at: 03/31/2023 1006   History of PE or DVT 0 Filed at: 03/31/2023 5387   Recent trauma or surgery 0 Filed at: 03/31/2023 0336   Hemoptysis 0 Filed at: 03/31/2023 0336   Exogenous estrogen 0 Filed at: 03/31/2023 0336   Unilateral leg swelling 0 Filed at: 03/31/2023 0336   PERC Rule for PE Results 0 Filed at: 03/31/2023 3733              SBIRT 20yo+    Flowsheet Row Most Recent Value   SBIRT (23 yo +)    In order to provide better care to our patients, we are screening all of our patients for alcohol and drug use  Would it be okay to ask you these screening questions? Yes Filed at: 03/31/2023 0221   Initial Alcohol Screen: US AUDIT-C     1  How often do you have a drink containing alcohol? 0 Filed at: 03/31/2023 0221   2  How many drinks containing alcohol do you have on a typical day you are drinking? 0 Filed at: 03/31/2023 0221   3a  Male UNDER 65: How often do you have five or more drinks on one occasion? 0 Filed at: 03/31/2023 0221   Audit-C Score 0 Filed at: 03/31/2023 0221   ANNABELLA: How many times in the past year have you    Used an illegal drug or used a prescription medication for non-medical reasons? Never Filed at: 03/31/2023 0221                    Medical Decision Making  Chest pain with low risk of acute coronary syndrome: acute illness or injury  Amount and/or Complexity of Data Reviewed  External Data Reviewed: notes  Labs: ordered  Radiology: independent interpretation performed  ECG/medicine tests: ordered and independent interpretation performed  Decision-making details documented in ED Course  Disposition  Final diagnoses:   Chest pain with low risk of acute coronary syndrome     Time reflects when diagnosis was documented in both MDM as applicable and the Disposition within this note     Time User Action Codes Description Comment    3/31/2023  3:37 AM Patsy Estes Add [R07 9] Chest pain with low risk of acute coronary syndrome       ED Disposition     ED Disposition   Discharge    Condition   Stable    Date/Time   Fri Mar 31, 2023  3:36 AM    Comment   Erica Zuleta discharge to home/self care                 Follow-up Information     Follow up With Specialties Details Why Contact Info Additional Information    Cathy Boudreaux, DO Family Medicine Schedule an appointment as soon as possible for a visit   [address] [facility] [address] [facility] [id number]  [address] Emergency Department Emergency Medicine  If symptoms worsen [address] [place], [phone]  [address] [facility] Emergency Department, [address] [facility], [address] [place] [address]          Patient's Medications   Discharge Prescriptions    No medications on file       No discharge procedures on file      PDMP Review     None          ED Provider  Electronically Signed by           Yann Snider MD  03/31/23 0761

## 2023-03-31 NOTE — ED TRIAGE NOTES
"Pt  Arrives to ED with complaints of waking up with chest pain and muscle spasms and \"shaking  \"  Pt   Reports hx of anxiety attacks  "

## 2023-07-13 ENCOUNTER — RA CDI HCC (OUTPATIENT)
Dept: OTHER | Facility: HOSPITAL | Age: 33
End: 2023-07-13

## 2023-07-13 NOTE — PROGRESS NOTES
[address] [facility] coding opportunities       Chart reviewed, no opportunity found: CHART REVIEWED, NO OPPORTUNITY FOUND        Patients Insurance        Commercial Insurance: Dean Arroyo

## 2023-08-03 ENCOUNTER — HOSPITAL ENCOUNTER (EMERGENCY)
Facility: HOSPITAL | Age: 33
Discharge: HOME | End: 2023-08-03
Attending: EMERGENCY MEDICINE | Admitting: EMERGENCY MEDICINE
Payer: COMMERCIAL

## 2023-08-03 VITALS
WEIGHT: 198 LBS | OXYGEN SATURATION: 99 % | TEMPERATURE: 97.9 F | BODY MASS INDEX: 29.33 KG/M2 | RESPIRATION RATE: 17 BRPM | HEIGHT: 69 IN | HEART RATE: 68 BPM | SYSTOLIC BLOOD PRESSURE: 165 MMHG | DIASTOLIC BLOOD PRESSURE: 98 MMHG

## 2023-08-03 DIAGNOSIS — G51.0 BELL'S PALSY: Primary | ICD-10-CM

## 2023-08-03 LAB
ALBUMIN SERPL BCP-MCNC: 4.8 G/DL (ref 3.5–5)
ALP SERPL-CCNC: 55 U/L (ref 34–104)
ALT SERPL W P-5'-P-CCNC: 23 U/L (ref 7–52)
ANION GAP SERPL CALCULATED.3IONS-SCNC: 7 MMOL/L
AST SERPL W P-5'-P-CCNC: 16 U/L (ref 13–39)
BASOPHILS # BLD AUTO: 0.04 THOUSANDS/ÂΜL (ref 0–0.1)
BASOPHILS NFR BLD AUTO: 0 % (ref 0–1)
BILIRUB SERPL-MCNC: 0.48 MG/DL (ref 0.2–1)
BUN SERPL-MCNC: 19 MG/DL (ref 5–25)
CALCIUM SERPL-MCNC: 10.1 MG/DL (ref 8.4–10.2)
CHLORIDE SERPL-SCNC: 103 MMOL/L (ref 96–108)
CO2 SERPL-SCNC: 29 MMOL/L (ref 21–32)
CREAT SERPL-MCNC: 1.21 MG/DL (ref 0.6–1.3)
EOSINOPHIL # BLD AUTO: 0.05 THOUSAND/ÂΜL (ref 0–0.61)
EOSINOPHIL NFR BLD AUTO: 1 % (ref 0–6)
ERYTHROCYTE [DISTWIDTH] IN BLOOD BY AUTOMATED COUNT: 12 % (ref 11.6–15.1)
GFR SERPL CREATININE-BSD FRML MDRD: 77 ML/MIN/1.73SQ M
GLUCOSE SERPL-MCNC: 98 MG/DL (ref 65–140)
HCT VFR BLD AUTO: 43.6 % (ref 36.5–49.3)
HGB BLD-MCNC: 14.8 G/DL (ref 12–17)
IMM GRANULOCYTES # BLD AUTO: 0.02 THOUSAND/UL (ref 0–0.2)
IMM GRANULOCYTES NFR BLD AUTO: 0 % (ref 0–2)
LYMPHOCYTES # BLD AUTO: 3.14 THOUSANDS/ÂΜL (ref 0.6–4.47)
LYMPHOCYTES NFR BLD AUTO: 35 % (ref 14–44)
MCH RBC QN AUTO: 30.1 PG (ref 26.8–34.3)
MCHC RBC AUTO-ENTMCNC: 33.9 G/DL (ref 31.4–37.4)
MCV RBC AUTO: 89 FL (ref 82–98)
MONOCYTES # BLD AUTO: 0.66 THOUSAND/ÂΜL (ref 0.17–1.22)
MONOCYTES NFR BLD AUTO: 7 % (ref 4–12)
NEUTROPHILS # BLD AUTO: 5.06 THOUSANDS/ÂΜL (ref 1.85–7.62)
NEUTS SEG NFR BLD AUTO: 57 % (ref 43–75)
NRBC BLD AUTO-RTO: 0 /100 WBCS
PLATELET # BLD AUTO: 251 THOUSANDS/UL (ref 149–390)
PMV BLD AUTO: 9.3 FL (ref 8.9–12.7)
POTASSIUM SERPL-SCNC: 4.2 MMOL/L (ref 3.5–5.3)
PROT SERPL-MCNC: 8.3 G/DL (ref 6.4–8.4)
RBC # BLD AUTO: 4.92 MILLION/UL (ref 3.88–5.62)
SODIUM SERPL-SCNC: 139 MMOL/L (ref 135–147)
WBC # BLD AUTO: 8.97 THOUSAND/UL (ref 4.31–10.16)

## 2023-08-03 PROCEDURE — 85025 COMPLETE CBC W/AUTO DIFF WBC: CPT | Performed by: EMERGENCY MEDICINE

## 2023-08-03 PROCEDURE — 86618 LYME DISEASE ANTIBODY: CPT | Performed by: EMERGENCY MEDICINE

## 2023-08-03 PROCEDURE — 36415 COLL VENOUS BLD VENIPUNCTURE: CPT | Performed by: PHYSICIAN ASSISTANT

## 2023-08-03 PROCEDURE — 80053 COMPREHEN METABOLIC PANEL: CPT | Performed by: EMERGENCY MEDICINE

## 2023-08-03 RX ORDER — PREDNISONE 20 MG/1
60 TABLET ORAL ONCE
Status: COMPLETED | OUTPATIENT
Start: 2023-08-03 | End: 2023-08-03

## 2023-08-03 RX ORDER — PREDNISONE 20 MG/1
60 TABLET ORAL DAILY
Qty: 18 TABLET | Refills: 0 | Status: SHIPPED | OUTPATIENT
Start: 2023-08-03 | End: 2023-08-09

## 2023-08-03 RX ORDER — MINERAL OIL, PETROLATUM 425; 573 MG/G; MG/G
OINTMENT OPHTHALMIC
Qty: 3.5 G | Refills: 0 | Status: SHIPPED | OUTPATIENT
Start: 2023-08-03 | End: 2023-08-10

## 2023-08-03 RX ORDER — POLYVINYL ALCOHOL 14 MG/ML
1 SOLUTION/ DROPS OPHTHALMIC AS NEEDED
Qty: 30 ML | Refills: 0 | Status: SHIPPED | OUTPATIENT
Start: 2023-08-03 | End: 2023-08-17

## 2023-08-03 RX ADMIN — PREDNISONE 60 MG: 20 TABLET ORAL at 19:51

## 2023-08-03 NOTE — ED PROVIDER NOTES
History  Chief Complaint   Patient presents with   • Facial Droop     Patient presents to the ED with c/o right sided facial droop and inability to close right eye, states it began a few days ago. No other deficits      26-year-old male presenting due to right-sided facial droop. No major medical history. States around 2 days ago he noticed some asymmetry of his face and dryness to his eyes. States today coworkers noticed the drooping to his face which prompted him to come to the emergency department for evaluation. States the droop and weakness includes the forehead. Says he also felt like he had some abnormal taste sensation with this. Denies any fever, pain or rash, loss of vision or hearing. Prior to Admission Medications   Prescriptions Last Dose Informant Patient Reported? Taking? Omega-3 Fatty Acids (Fish Oil) 1200 MG CAPS  Self Yes No   Sig: Take by mouth   Probiotic Product (ALIGN PO)  Self Yes No   Sig: Take by mouth daily      Facility-Administered Medications: None       Past Medical History:   Diagnosis Date   • Bell palsy    • GERD (gastroesophageal reflux disease)    • Kidney stones     Multiple episodes   • Palpitations        Past Surgical History:   Procedure Laterality Date   • CYSTOSCOPY W/ URETEROSCOPY W/ LITHOTRIPSY     • PILONIDAL CYST EXCISION         Family History   Problem Relation Age of Onset   • Hypertension Mother    • Gallbladder disease Mother    • Diabetes Father    • Hypertension Father    • Gallbladder disease Maternal Grandmother    • Gallbladder disease Maternal Aunt    • Colon cancer Neg Hx    • Colon polyps Neg Hx      I have reviewed and agree with the history as documented.     E-Cigarette/Vaping   • E-Cigarette Use Former User      E-Cigarette/Vaping Substances   • Nicotine Yes    • THC No    • CBD No    • Flavoring No    • Other No    • Unknown No      Social History     Tobacco Use   • Smoking status: Former     Types: Cigarettes     Quit date: 2017 Years since quittin.5   • Smokeless tobacco: Never   Vaping Use   • Vaping Use: Former   • Substances: Nicotine   Substance Use Topics   • Alcohol use: Not Currently   • Drug use: Never       Review of Systems   Constitutional: Negative for fatigue and fever. HENT: Negative for congestion and trouble swallowing. Eyes: Negative for visual disturbance. Respiratory: Negative for cough, chest tightness and shortness of breath. Cardiovascular: Negative for chest pain. Gastrointestinal: Negative for abdominal pain, diarrhea, nausea and vomiting. Genitourinary: Negative for flank pain. Musculoskeletal: Negative for back pain and gait problem. Skin: Negative for rash and wound. Neurological: Positive for facial asymmetry. Negative for syncope and headaches. Psychiatric/Behavioral: Negative for agitation. All other systems reviewed and are negative. Physical Exam  Physical Exam  Vitals and nursing note reviewed. Constitutional:       General: He is not in acute distress. Appearance: He is well-developed. HENT:      Head: Normocephalic and atraumatic. Right Ear: Tympanic membrane normal.      Left Ear: Tympanic membrane normal.   Eyes:      Conjunctiva/sclera: Conjunctivae normal.   Cardiovascular:      Rate and Rhythm: Normal rate and regular rhythm. Heart sounds: No murmur heard. Pulmonary:      Effort: Pulmonary effort is normal. No respiratory distress. Breath sounds: Normal breath sounds. Abdominal:      Palpations: Abdomen is soft. Tenderness: There is no abdominal tenderness. Musculoskeletal:         General: No swelling. Cervical back: Neck supple. Skin:     General: Skin is warm and dry. Capillary Refill: Capillary refill takes less than 2 seconds. Neurological:      General: No focal deficit present. Mental Status: He is alert and oriented to person, place, and time.       Cranial Nerves: Cranial nerve deficit (right facial droop) present. Sensory: No sensory deficit. Motor: No weakness.       Coordination: Coordination normal.      Gait: Gait normal.   Psychiatric:         Mood and Affect: Mood normal.         Vital Signs  ED Triage Vitals [08/03/23 1744]   Temperature Pulse Respirations Blood Pressure SpO2   97.9 °F (36.6 °C) 71 18 (!) 172/101 98 %      Temp Source Heart Rate Source Patient Position - Orthostatic VS BP Location FiO2 (%)   Temporal Monitor Sitting Left arm --      Pain Score       No Pain           Vitals:    08/03/23 1744 08/03/23 1943   BP: (!) 172/101 165/98   Pulse: 71 68   Patient Position - Orthostatic VS: Sitting Sitting         Visual Acuity  Visual Acuity    Flowsheet Row Most Recent Value   L Pupil Size (mm) 3   R Pupil Size (mm) 3          ED Medications  Medications   predniSONE tablet 60 mg (60 mg Oral Given 8/3/23 1951)       Diagnostic Studies  Results Reviewed     Procedure Component Value Units Date/Time    Lyme Total AB W Reflex to IGM/IGG [[id number]]  (Normal) Collected: 08/03/23 1750    Lab Status: Final result Specimen: Blood from Arm, Right Updated: 08/04/23 1305     Lyme Total Antibodies Negative    Comprehensive metabolic panel [[id number]] Collected: 08/03/23 1750    Lab Status: Final result Specimen: Blood from Arm, Right Updated: 08/03/23 1819     Sodium 139 mmol/L      Potassium 4.2 mmol/L      Chloride 103 mmol/L      CO2 29 mmol/L      ANION GAP 7 mmol/L      BUN 19 mg/dL      Creatinine 1.21 mg/dL      Glucose 98 mg/dL      Calcium 10.1 mg/dL      AST 16 U/L      ALT 23 U/L      Alkaline Phosphatase 55 U/L      Total Protein 8.3 g/dL      Albumin 4.8 g/dL      Total Bilirubin 0.48 mg/dL      eGFR 77 ml/min/1.73sq m     Narrative:      [place] guidelines for Chronic Kidney Disease (CKD):   •  Stage 1 with normal or high GFR (GFR > 90 mL/min/1.73 square meters)  •  Stage 2 Mild CKD (GFR = 60-89 mL/min/1.73 square meters)  •  Stage 3A Moderate CKD (GFR = 45-59 mL/min/1.73 square meters)  •  Stage 3B Moderate CKD (GFR = 30-44 mL/min/1.73 square meters)  •  Stage 4 Severe CKD (GFR = 15-29 mL/min/1.73 square meters)  •  Stage 5 End Stage CKD (GFR <15 mL/min/1.73 square meters)  Note: GFR calculation is accurate only with a steady state creatinine    CBC and differential [[id number]] Collected: 08/03/23 1750    Lab Status: Final result Specimen: Blood from Arm, Right Updated: 08/03/23 1755     WBC 8.97 Thousand/uL      RBC 4.92 Million/uL      Hemoglobin 14.8 g/dL      Hematocrit 43.6 %      MCV 89 fL      MCH 30.1 pg      MCHC 33.9 g/dL      RDW 12.0 %      MPV 9.3 fL      Platelets 878 Thousands/uL      nRBC 0 /100 WBCs      Neutrophils Relative 57 %      Immat GRANS % 0 %      Lymphocytes Relative 35 %      Monocytes Relative 7 %      Eosinophils Relative 1 %      Basophils Relative 0 %      Neutrophils Absolute 5.06 Thousands/µL      Immature Grans Absolute 0.02 Thousand/uL      Lymphocytes Absolute 3.14 Thousands/µL      Monocytes Absolute 0.66 Thousand/µL      Eosinophils Absolute 0.05 Thousand/µL      Basophils Absolute 0.04 Thousands/µL                  No orders to display              Procedures  Procedures         ED Course                               SBIRT 20yo+    Flowsheet Row Most Recent Value   Initial Alcohol Screen: US AUDIT-C     1. How often do you have a drink containing alcohol? 0 Filed at: 08/03/2023 1747   2. How many drinks containing alcohol do you have on a typical day you are drinking? 0 Filed at: 08/03/2023 1747   3a. Male UNDER 65: How often do you have five or more drinks on one occasion? 0 Filed at: 08/03/2023 1747   3b. FEMALE Any Age, or MALE 65+: How often do you have 4 or more drinks on one occassion? 0 Filed at: 08/03/2023 1747   Audit-C Score 0 Filed at: 08/03/2023 1747   ANNABELLA: How many times in the past year have you. .. Used an illegal drug or used a prescription medication for non-medical reasons?  Never Filed at: 08/03/2023 1747 Medical Decision Making  Exam consistent with bells palsy. No hx of tick bites or rash. No pain. Provided drops and course of prednisone. Will follow up with pcp. Amount and/or Complexity of Data Reviewed  Labs: ordered. Risk  OTC drugs. Prescription drug management. Disposition  Final diagnoses:   Bell's palsy     Time reflects when diagnosis was documented in both MDM as applicable and the Disposition within this note     Time User Action Codes Description Comment    8/3/2023  7:45 PM Martamarlene Lawson Fern [G51.0] Bell's palsy       ED Disposition     ED Disposition   Discharge    Condition   Stable    Date/Time   Thu Aug 3, 2023  7:48 PM    Comment   Hero Sheila discharge to home/self care.                Follow-up Information     Follow up With Specialties Details Why Contact Info    Snow Contreras DO Family Medicine   [address] [place] [id number] [phone]            Discharge Medication List as of 8/3/2023  7:48 PM      START taking these medications    Details   polyvinyl alcohol (LIQUIFILM TEARS) 1.4 % ophthalmic solution Administer 1 drop to the right eye as needed for dry eyes for up to 14 days, Starting Thu 8/3/2023, Until Thu 8/17/2023 at 2359, Normal      predniSONE 20 mg tablet Take 3 tablets (60 mg total) by mouth daily for 6 days, Starting Thu 8/3/2023, Until Wed 8/9/2023, Normal      White Petrolatum-Mineral Oil (artificial tears) Administer to the right eye daily at bedtime for 7 days, Starting Thu 8/3/2023, Until Thu 8/10/2023, Normal         CONTINUE these medications which have NOT CHANGED    Details   Omega-3 Fatty Acids (Fish Oil) 1200 MG CAPS Take by mouth, Historical Med      Probiotic Product (ALIGN PO) Take by mouth daily, Historical Med      busPIRone (BUSPAR) 10 mg tablet Take 10 mg by mouth 2 (two) times a day, Starting Tue 11/24/2020, Historical Med      omeprazole (PriLOSEC) 20 mg delayed release capsule TAKE ONE CAPSULE BY MOUTH EVERY DAY, Normal             No discharge procedures on file.     PDMP Review     None          ED Provider  Electronically Signed by           Derian Sesay DO  08/04/23 2013

## 2023-08-03 NOTE — DISCHARGE INSTRUCTIONS
Medication was sent to your pharmacy. Please take as prescribed. Continue to use artificial tears every few hours while awake until symptoms resolved.   Use the ointment at nighttime    Follow-up with your family doctor

## 2023-08-04 ENCOUNTER — OFFICE VISIT (OUTPATIENT)
Dept: FAMILY MEDICINE CLINIC | Facility: CLINIC | Age: 33
End: 2023-08-04
Payer: COMMERCIAL

## 2023-08-04 VITALS
TEMPERATURE: 97.8 F | HEART RATE: 104 BPM | DIASTOLIC BLOOD PRESSURE: 80 MMHG | WEIGHT: 214 LBS | BODY MASS INDEX: 31.7 KG/M2 | OXYGEN SATURATION: 98 % | HEIGHT: 69 IN | SYSTOLIC BLOOD PRESSURE: 120 MMHG

## 2023-08-04 DIAGNOSIS — K21.9 GASTROESOPHAGEAL REFLUX DISEASE WITHOUT ESOPHAGITIS: ICD-10-CM

## 2023-08-04 DIAGNOSIS — E78.2 MIXED HYPERLIPIDEMIA: ICD-10-CM

## 2023-08-04 DIAGNOSIS — G51.0 BELL PALSY: Primary | ICD-10-CM

## 2023-08-04 LAB — B BURGDOR IGG+IGM SER QL IA: NEGATIVE

## 2023-08-04 PROCEDURE — 99204 OFFICE O/P NEW MOD 45 MIN: CPT | Performed by: FAMILY MEDICINE

## 2023-08-04 RX ORDER — VALACYCLOVIR HYDROCHLORIDE 1 G/1
1000 TABLET, FILM COATED ORAL 3 TIMES DAILY
Qty: 21 TABLET | Refills: 0 | Status: SHIPPED | OUTPATIENT
Start: 2023-08-04 | End: 2023-08-11

## 2023-08-04 RX ORDER — PANTOPRAZOLE SODIUM 20 MG/1
20 TABLET, DELAYED RELEASE ORAL
Qty: 30 TABLET | Refills: 5 | Status: SHIPPED | OUTPATIENT
Start: 2023-08-04 | End: 2024-01-31

## 2023-08-04 NOTE — PROGRESS NOTES
Assessment/Plan:    Bell palsy  See ER report---add valtrex       Diagnoses and all orders for this visit:    Bell palsy  -     valACYclovir (VALTREX) 1,000 mg tablet; Take 1 tablet (1,000 mg total) by mouth 3 (three) times a day for 7 days    Gastroesophageal reflux disease without esophagitis  -     pantoprazole (PROTONIX) 20 mg tablet; Take 1 tablet (20 mg total) by mouth daily before breakfast    Mixed hyperlipidemia  -     Lipid Panel with Direct LDL reflex; Future  -     Lipid Panel with Direct LDL reflex          Subjective:   Chief Complaint   Patient presents with   • Establish Care     Np Establish care, check up and blood work bell palsy and was in the ER he takes emporozole want a new script for that         Patient ID: Amarilis Morse is a 26 y.o. male. HPI    The following portions of the patient's history were reviewed and updated as appropriate: allergies, current medications, past family history, past medical history, past social history, past surgical history and problem list.    Review of Systems   Constitutional: Negative for fatigue, fever and unexpected weight change. HENT: Negative for congestion, sinus pain and sore throat. Eyes: Negative for visual disturbance. Respiratory: Negative for shortness of breath and wheezing. Cardiovascular: Negative for chest pain and palpitations. Gastrointestinal: Negative for abdominal pain, nausea and vomiting. Musculoskeletal: Negative. Negative for arthralgias and myalgias. Neurological: Negative for syncope, weakness and numbness. Psychiatric/Behavioral: Negative. Negative for confusion, dysphoric mood and suicidal ideas. Objective:  Vitals:    08/04/23 1453   BP: 120/80   Pulse: 104   Temp: 97.8 °F (36.6 °C)   TempSrc: Tympanic   SpO2: 98%   Weight: 97.1 kg (214 lb)   Height: 5' 9" (1.753 m)      Physical Exam  Constitutional:       Appearance: He is well-developed.    HENT:      Right Ear: Ear canal normal. Tympanic membrane is not injected. Left Ear: Ear canal normal. Tympanic membrane is not injected. Nose: Nose normal.   Eyes:      General:         Right eye: No discharge. Left eye: No discharge. Conjunctiva/sclera: Conjunctivae normal.      Pupils: Pupils are equal, round, and reactive to light. Neck:      Thyroid: No thyromegaly. Cardiovascular:      Rate and Rhythm: Normal rate and regular rhythm. Heart sounds: Normal heart sounds. No murmur heard. Pulmonary:      Effort: Pulmonary effort is normal. No respiratory distress. Breath sounds: Normal breath sounds. No wheezing. Abdominal:      General: Bowel sounds are normal. There is no distension. Palpations: Abdomen is soft. Tenderness: There is no abdominal tenderness. Musculoskeletal:         General: Normal range of motion. Cervical back: Normal range of motion and neck supple. Lymphadenopathy:      Cervical: No cervical adenopathy. Skin:     General: Skin is warm and dry. Neurological:      Mental Status: He is alert and oriented to person, place, and time. He is not disoriented. Sensory: No sensory deficit. Gait: Gait normal.      Deep Tendon Reflexes: Reflexes are normal and symmetric. Psychiatric:         Speech: Speech normal.         Behavior: Behavior normal.         Thought Content:  Thought content normal.         Judgment: Judgment normal.

## 2023-09-03 LAB
CHOLEST SERPL-MCNC: 295 MG/DL
CHOLEST/HDLC SERPL: 7 (CALC)
HDLC SERPL-MCNC: 42 MG/DL
LDLC SERPL CALC-MCNC: 220 MG/DL (CALC)
NONHDLC SERPL-MCNC: 253 MG/DL (CALC)
TRIGL SERPL-MCNC: 163 MG/DL

## 2023-09-05 ENCOUNTER — TELEPHONE (OUTPATIENT)
Dept: FAMILY MEDICINE CLINIC | Facility: CLINIC | Age: 33
End: 2023-09-05

## 2023-09-05 NOTE — TELEPHONE ENCOUNTER
Pt aware      ----- Message from Bruce Moreno MD sent at 9/4/2023  8:15 AM EDT -----  Chol very very high---sched appt  ----- Message -----  From: Becky Alexis Results In  Sent: 9/3/2023   6:00 AM EDT  To: Bruce Moreno MD

## 2023-09-26 ENCOUNTER — OFFICE VISIT (OUTPATIENT)
Dept: FAMILY MEDICINE CLINIC | Facility: CLINIC | Age: 33
End: 2023-09-26
Payer: COMMERCIAL

## 2023-09-26 VITALS
SYSTOLIC BLOOD PRESSURE: 124 MMHG | BODY MASS INDEX: 28.88 KG/M2 | WEIGHT: 195 LBS | DIASTOLIC BLOOD PRESSURE: 74 MMHG | HEART RATE: 78 BPM | TEMPERATURE: 97.9 F | HEIGHT: 69 IN | OXYGEN SATURATION: 97 %

## 2023-09-26 DIAGNOSIS — K21.00 GASTROESOPHAGEAL REFLUX DISEASE WITH ESOPHAGITIS WITHOUT HEMORRHAGE: ICD-10-CM

## 2023-09-26 DIAGNOSIS — Z00.00 WELLNESS EXAMINATION: ICD-10-CM

## 2023-09-26 DIAGNOSIS — G44.89 HEADACHE SYNDROME: ICD-10-CM

## 2023-09-26 DIAGNOSIS — E78.2 MIXED HYPERLIPIDEMIA: Primary | ICD-10-CM

## 2023-09-26 PROCEDURE — 99395 PREV VISIT EST AGE 18-39: CPT | Performed by: FAMILY MEDICINE

## 2023-09-26 PROCEDURE — 99214 OFFICE O/P EST MOD 30 MIN: CPT | Performed by: FAMILY MEDICINE

## 2023-09-26 RX ORDER — ROSUVASTATIN CALCIUM 5 MG/1
5 TABLET, COATED ORAL DAILY
Qty: 90 TABLET | Refills: 3 | Status: SHIPPED | OUTPATIENT
Start: 2023-09-26

## 2023-09-26 RX ORDER — OMEPRAZOLE 20 MG/1
20 CAPSULE, DELAYED RELEASE ORAL
Qty: 90 CAPSULE | Refills: 3 | Status: SHIPPED | OUTPATIENT
Start: 2023-09-26 | End: 2024-09-20

## 2023-09-26 NOTE — PROGRESS NOTES
Assessment/Plan:    Mixed hyperlipidemia  Labs rev/start crestor       Diagnoses and all orders for this visit:    Mixed hyperlipidemia  -     rosuvastatin (CRESTOR) 5 mg tablet; Take 1 tablet (5 mg total) by mouth daily    Wellness examination          Subjective:   Chief Complaint   Patient presents with   • labs review   • Physical Exam        Patient ID: Silva Simmonds is a 26 y.o. male. HPI    The following portions of the patient's history were reviewed and updated as appropriate: allergies, current medications, past family history, past medical history, past social history, past surgical history and problem list.    Review of Systems   Constitutional: Negative for chills and fever. HENT: Negative for facial swelling and sinus pressure. Eyes: Negative for visual disturbance. Respiratory: Negative for shortness of breath and wheezing. Cardiovascular: Negative for chest pain. Gastrointestinal: Negative for abdominal pain. Musculoskeletal: Negative for myalgias. Skin: Negative for rash. Neurological: Negative for weakness. Psychiatric/Behavioral: Negative for confusion and hallucinations. Objective:  Vitals:    09/26/23 1424   BP: 124/74   BP Location: Left arm   Patient Position: Standing   Cuff Size: Standard   Pulse: 78   Temp: 97.9 °F (36.6 °C)   TempSrc: Tympanic   SpO2: 97%   Weight: 88.5 kg (195 lb)   Height: 5' 9" (1.753 m)      Physical Exam  Constitutional:       General: He is not in acute distress. Appearance: He is well-developed. He is not diaphoretic. HENT:      Right Ear: Ear canal normal. Tympanic membrane is not injected. Left Ear: Ear canal normal. Tympanic membrane is not injected. Nose: Nose normal.      Mouth/Throat:      Pharynx: No oropharyngeal exudate. Eyes:      Conjunctiva/sclera: Conjunctivae normal.      Pupils: Pupils are equal, round, and reactive to light. Neck:      Thyroid: No thyromegaly.    Cardiovascular:      Rate and Rhythm: Normal rate and regular rhythm. Heart sounds: Normal heart sounds. No murmur heard. Pulmonary:      Effort: Pulmonary effort is normal. No respiratory distress. Breath sounds: Normal breath sounds. No wheezing. Abdominal:      General: Bowel sounds are normal.      Palpations: Abdomen is soft. There is no hepatomegaly, splenomegaly or mass. Tenderness: There is no abdominal tenderness. Musculoskeletal:         General: No tenderness or deformity. Normal range of motion. Cervical back: Normal range of motion and neck supple. Skin:     General: Skin is warm and dry. Coloration: Skin is not pale. Findings: No rash. Neurological:      Mental Status: He is alert and oriented to person, place, and time. He is not disoriented. Sensory: No sensory deficit.       Gait: Gait normal.   Psychiatric:         Speech: Speech normal.         Behavior: Behavior normal.

## 2023-09-26 NOTE — PROGRESS NOTES
HPI:  Flores Paz is a 26 y.o. male here for his yearly health maintenance exam.   Patient Active Problem List   Diagnosis   • Gastroesophageal reflux disease   • Palpitations   • Epigastric pain   • Kidney stones   • Gallbladder polyp   • Bell palsy   • Mixed hyperlipidemia     Past Medical History:   Diagnosis Date   • Bell palsy    • GERD (gastroesophageal reflux disease)    • Kidney stones     Multiple episodes   • Palpitations        1. Advanced Directive: n     2. Durable Power of  for Healthcare: n     3. Social History:           Drug and alcohol History: n                  4. Immunizations up to date: y                 Lifestyle:                           Healthy Diet:y                          Alcohol Use:n                          Tobacco Use:n                          Regular exercise:y                          Weight concerns:n                               5. Over the past 2 weeks, how often have you been bothered by the following:              Little interest or pleasure in doing things:n              Felling down, depressed or hopeless:n       Current Outpatient Medications   Medication Sig Dispense Refill   • Omega-3 Fatty Acids (Fish Oil) 1200 MG CAPS Take by mouth     • pantoprazole (PROTONIX) 20 mg tablet Take 1 tablet (20 mg total) by mouth daily before breakfast 30 tablet 5   • rosuvastatin (CRESTOR) 5 mg tablet Take 1 tablet (5 mg total) by mouth daily 90 tablet 3   • polyvinyl alcohol (LIQUIFILM TEARS) 1.4 % ophthalmic solution Administer 1 drop to the right eye as needed for dry eyes for up to 14 days 30 mL 0   • Probiotic Product (ALIGN PO) Take by mouth daily     • valACYclovir (VALTREX) 1,000 mg tablet Take 1 tablet (1,000 mg total) by mouth 3 (three) times a day for 7 days 21 tablet 0   • White Petrolatum-Mineral Oil (artificial tears) Administer to the right eye daily at bedtime for 7 days 3.5 g 0     No current facility-administered medications for this visit.      Allergies Allergen Reactions   • Sulfa Antibiotics Rash and Hives     Immunization History   Administered Date(s) Administered   • INFLUENZA 11/25/2014   • TD (adult) Preservative Free 01/21/2021       Patient Care Team:  Fidencio Olivarez MD as PCP - General (Family Medicine)    Review of Systems   Constitutional: Negative for fatigue, fever and unexpected weight change. HENT: Negative for congestion, sinus pain and sore throat. Eyes: Negative for visual disturbance. Respiratory: Negative for shortness of breath and wheezing. Cardiovascular: Negative for chest pain and palpitations. Gastrointestinal: Negative for abdominal pain, nausea and vomiting. Musculoskeletal: Negative. Negative for arthralgias and myalgias. Neurological: Negative for syncope, weakness and numbness. Psychiatric/Behavioral: Negative. Negative for confusion, dysphoric mood and suicidal ideas. Physical Exam :  Physical Exam  Constitutional:       Appearance: He is well-developed. HENT:      Right Ear: Ear canal normal. Tympanic membrane is not injected. Left Ear: Ear canal normal. Tympanic membrane is not injected. Nose: Nose normal.   Eyes:      General:         Right eye: No discharge. Left eye: No discharge. Conjunctiva/sclera: Conjunctivae normal.      Pupils: Pupils are equal, round, and reactive to light. Neck:      Thyroid: No thyromegaly. Cardiovascular:      Rate and Rhythm: Normal rate and regular rhythm. Heart sounds: Normal heart sounds. No murmur heard. Pulmonary:      Effort: Pulmonary effort is normal. No respiratory distress. Breath sounds: Normal breath sounds. No wheezing. Abdominal:      General: Bowel sounds are normal. There is no distension. Palpations: Abdomen is soft. Tenderness: There is no abdominal tenderness. Musculoskeletal:         General: Normal range of motion. Cervical back: Normal range of motion and neck supple.    Lymphadenopathy: Cervical: No cervical adenopathy. Skin:     General: Skin is warm and dry. Neurological:      Mental Status: He is alert and oriented to person, place, and time. He is not disoriented. Sensory: No sensory deficit. Gait: Gait normal.      Deep Tendon Reflexes: Reflexes are normal and symmetric. Psychiatric:         Speech: Speech normal.         Behavior: Behavior normal.         Thought Content: Thought content normal.         Judgment: Judgment normal.           Assessment and Plan:  1. Mixed hyperlipidemia  rosuvastatin (CRESTOR) 5 mg tablet      2.  Wellness examination            Health Maintenance Due   Topic Date Due   • Hepatitis C Screening  Never done   • COVID-19 Vaccine (1) Never done   • HIV Screening  Never done   • BMI: Followup Plan  Never done   • DTaP,Tdap,and Td Vaccines (1 - Tdap) 01/22/2021   • Influenza Vaccine (1) 09/01/2023

## 2024-01-30 ENCOUNTER — RA CDI HCC (OUTPATIENT)
Dept: OTHER | Facility: HOSPITAL | Age: 34
End: 2024-01-30

## 2024-01-31 ENCOUNTER — OFFICE VISIT (OUTPATIENT)
Dept: FAMILY MEDICINE CLINIC | Facility: CLINIC | Age: 34
End: 2024-01-31
Payer: COMMERCIAL

## 2024-01-31 VITALS
HEART RATE: 92 BPM | OXYGEN SATURATION: 98 % | BODY MASS INDEX: 29.53 KG/M2 | SYSTOLIC BLOOD PRESSURE: 122 MMHG | DIASTOLIC BLOOD PRESSURE: 80 MMHG | TEMPERATURE: 95.7 F | WEIGHT: 200 LBS

## 2024-01-31 DIAGNOSIS — K82.4 GALLBLADDER POLYP: ICD-10-CM

## 2024-01-31 DIAGNOSIS — K21.9 GASTROESOPHAGEAL REFLUX DISEASE, UNSPECIFIED WHETHER ESOPHAGITIS PRESENT: ICD-10-CM

## 2024-01-31 DIAGNOSIS — R10.84 GENERALIZED ABDOMINAL PAIN: Primary | ICD-10-CM

## 2024-01-31 PROCEDURE — 99213 OFFICE O/P EST LOW 20 MIN: CPT | Performed by: NURSE PRACTITIONER

## 2024-01-31 NOTE — PATIENT INSTRUCTIONS
Abdominal Ultrasound as ordered  Continue Omeprazole as ordered  If symptoms continue f/up with GI after ultrasound is completed

## 2024-01-31 NOTE — PROGRESS NOTES
[facility]        NAME: Wei Cole is a 33 y.o. male  : 1990    MRN: [id number]  DATE: 2024  TIME: 4:41 PM    Assessment and Plan   Generalized abdominal pain [R10.84]  1. Generalized abdominal pain  Ambulatory Referral to Gastroenterology    US abdomen complete      2. Gallbladder polyp  US abdomen complete      3. Gastroesophageal reflux disease, unspecified whether esophagitis present  Ambulatory Referral to Gastroenterology            Patient Instructions     Patient Instructions   Abdominal Ultrasound as ordered  Continue Omeprazole as ordered  If symptoms continue f/up with GI after ultrasound is completed          Chief Complaint     Chief Complaint   Patient presents with    Abdominal Pain    Diarrhea     On and off         History of Present Illness       C/o intermittent epigastric pain followed by gas/bloating. Taking Mylanta which provides some relief. He also has pain in right upper quadrant-hx of gall bladder polyp. He was followed by GI-Dr. Gill but has not been seen in a few years. Has a hx of Gerd he is taking omeprazole.    Abdominal Pain  This is a recurrent problem. The current episode started more than 1 month ago. The onset quality is gradual. The problem occurs daily. The most recent episode lasted 1 hours. The problem has been unchanged. The pain is located in the RUQ and epigastric region. The pain is at a severity of 3/10. The quality of the pain is aching, burning, cramping and dull. The abdominal pain does not radiate. Associated symptoms include belching, diarrhea and flatus. Pertinent negatives include no anorexia, arthralgias, constipation, dysuria, fever, frequency, headaches, hematochezia, hematuria, melena, myalgias, nausea, vomiting or weight loss. The pain is aggravated by certain positions and eating. The pain is relieved by Belching and passing flatus. Prior diagnostic workup includes upper endoscopy.       Review of Systems    Review of Systems   Constitutional:  Negative for fever and weight loss.   Gastrointestinal:  Positive for abdominal pain, diarrhea and flatus. Negative for anorexia, constipation, hematochezia, melena, nausea and vomiting.   Genitourinary:  Negative for dysuria, frequency and hematuria.   Musculoskeletal:  Negative for arthralgias and myalgias.   Neurological:  Negative for headaches.         Current Medications       Current Outpatient Medications:     Omega-3 Fatty Acids (Fish Oil) 1200 MG CAPS, Take by mouth, Disp: , Rfl:     omeprazole (PriLOSEC) 20 mg delayed release capsule, Take 1 capsule (20 mg total) by mouth daily before breakfast, Disp: 90 capsule, Rfl: 3    rosuvastatin (CRESTOR) 5 mg tablet, Take 1 tablet (5 mg total) by mouth daily, Disp: 90 tablet, Rfl: 3    polyvinyl alcohol (LIQUIFILM TEARS) 1.4 % ophthalmic solution, Administer 1 drop to the right eye as needed for dry eyes for up to 14 days, Disp: 30 mL, Rfl: 0    Probiotic Product (ALIGN PO), Take by mouth daily, Disp: , Rfl:     valACYclovir (VALTREX) 1,000 mg tablet, Take 1 tablet (1,000 mg total) by mouth 3 (three) times a day for 7 days, Disp: 21 tablet, Rfl: 0    White Petrolatum-Mineral Oil (artificial tears), Administer to the right eye daily at bedtime for 7 days, Disp: 3.5 g, Rfl: 0    Current Allergies     Allergies as of 01/31/2024 - Reviewed 01/31/2024   Allergen Reaction Noted    Sulfa antibiotics Rash and Hives 03/07/2019            The following portions of the patient's history were reviewed and updated as appropriate: allergies, current medications, past family history, past medical history, past social history, past surgical history and problem list.     Past Medical History:   Diagnosis Date    Bell palsy     GERD (gastroesophageal reflux disease)     Kidney stones     Multiple episodes    Palpitations        Past Surgical History:   Procedure Laterality Date    CYSTOSCOPY W/ URETEROSCOPY W/ LITHOTRIPSY      PILONIDAL CYST  EXCISION         Family History   Problem Relation Age of Onset    Hypertension Mother     Gallbladder disease Mother     Diabetes Father     Hypertension Father     Gallbladder disease Maternal Grandmother     Gallbladder disease Maternal Aunt     Colon cancer Neg Hx     Colon polyps Neg Hx          Medications have been verified.        Objective   /80 (BP Location: Left arm, Patient Position: Sitting, Cuff Size: Standard)   Pulse 92   Temp (!) 95.7 °F (35.4 °C) (Tympanic)   Wt 90.7 kg (200 lb)   SpO2 98%   BMI 29.53 kg/m²        Physical Exam     Physical Exam  Vitals reviewed.   Constitutional:       General: He is not in acute distress.     Appearance: He is well-developed. He is not ill-appearing, toxic-appearing or diaphoretic.   HENT:      Head: Normocephalic.   Cardiovascular:      Rate and Rhythm: Normal rate and regular rhythm.      Heart sounds: Normal heart sounds. No murmur heard.     No friction rub. No gallop.   Pulmonary:      Effort: Pulmonary effort is normal. No respiratory distress.      Breath sounds: Normal breath sounds. No wheezing or rhonchi.   Chest:      Chest wall: No tenderness.   Abdominal:      General: Bowel sounds are normal. There is no distension.      Palpations: Abdomen is soft.      Tenderness: There is generalized abdominal tenderness and tenderness in the right upper quadrant and epigastric area. There is no guarding or rebound.      Hernia: No hernia is present.   Skin:     General: Skin is warm and dry.      Coloration: Skin is not jaundiced or pale.      Findings: No erythema or rash.   Neurological:      Mental Status: He is alert and oriented to person, place, and time.   Psychiatric:         Mood and Affect: Mood normal.         Behavior: Behavior normal.

## 2024-03-01 ENCOUNTER — HOSPITAL ENCOUNTER (OUTPATIENT)
Dept: ULTRASOUND IMAGING | Facility: HOSPITAL | Age: 34
End: 2024-03-01
Payer: COMMERCIAL

## 2024-03-01 DIAGNOSIS — K82.4 GALLBLADDER POLYP: ICD-10-CM

## 2024-03-01 DIAGNOSIS — R10.84 GENERALIZED ABDOMINAL PAIN: ICD-10-CM

## 2024-03-01 PROCEDURE — 76700 US EXAM ABDOM COMPLETE: CPT

## 2024-03-07 ENCOUNTER — TELEPHONE (OUTPATIENT)
Dept: FAMILY MEDICINE CLINIC | Facility: CLINIC | Age: 34
End: 2024-03-07

## 2024-03-07 NOTE — TELEPHONE ENCOUNTER
----- Message from ELIZABETH Earl sent at 3/6/2024  4:14 PM EST -----  Call with results:  1.  Stable gallbladder polyps requiring no further work-up or follow-up.  2.  Resolved splenomegaly.

## 2024-05-16 ENCOUNTER — OFFICE VISIT (OUTPATIENT)
Dept: FAMILY MEDICINE CLINIC | Facility: CLINIC | Age: 34
End: 2024-05-16
Payer: COMMERCIAL

## 2024-05-16 VITALS
HEART RATE: 81 BPM | SYSTOLIC BLOOD PRESSURE: 118 MMHG | BODY MASS INDEX: 31.1 KG/M2 | DIASTOLIC BLOOD PRESSURE: 84 MMHG | OXYGEN SATURATION: 98 % | HEIGHT: 69 IN | WEIGHT: 210 LBS

## 2024-05-16 DIAGNOSIS — E78.2 MIXED HYPERLIPIDEMIA: ICD-10-CM

## 2024-05-16 DIAGNOSIS — F41.9 ANXIETY: Primary | ICD-10-CM

## 2024-05-16 PROCEDURE — 99214 OFFICE O/P EST MOD 30 MIN: CPT | Performed by: FAMILY MEDICINE

## 2024-05-16 RX ORDER — LORAZEPAM 1 MG/1
1 TABLET ORAL EVERY 8 HOURS PRN
Qty: 30 TABLET | Refills: 3 | Status: SHIPPED | OUTPATIENT
Start: 2024-05-16

## 2024-05-16 NOTE — PROGRESS NOTES
"Assessment/Plan:    No problem-specific Assessment & Plan notes found for this encounter.       There are no diagnoses linked to this encounter.      Subjective:   Chief Complaint   Patient presents with    Anxiety        Patient ID: Wei Cole is a 33 y.o. male.    Anxiety  Patient reports no chest pain, confusion, nausea, palpitations, shortness of breath or suicidal ideas.           The following portions of the patient's history were reviewed and updated as appropriate: allergies, current medications, past family history, past medical history, past social history, past surgical history and problem list.    Review of Systems   Constitutional:  Negative for fatigue, fever and unexpected weight change.   HENT:  Negative for congestion, sinus pain and sore throat.    Eyes:  Negative for visual disturbance.   Respiratory:  Negative for shortness of breath and wheezing.    Cardiovascular:  Negative for chest pain and palpitations.   Gastrointestinal:  Negative for abdominal pain, nausea and vomiting.   Musculoskeletal: Negative.  Negative for arthralgias and myalgias.   Neurological:  Negative for syncope, weakness and numbness.   Psychiatric/Behavioral: Negative.  Negative for confusion, dysphoric mood and suicidal ideas.          Objective:  Vitals:    05/16/24 1441   BP: 118/84   Pulse: 81   SpO2: 98%   Weight: 95.3 kg (210 lb)   Height: 5' 9\" (1.753 m)      Physical Exam  Constitutional:       Appearance: He is well-developed.   HENT:      Right Ear: Ear canal normal. Tympanic membrane is not injected.      Left Ear: Ear canal normal. Tympanic membrane is not injected.      Nose: Nose normal.   Eyes:      General:         Right eye: No discharge.         Left eye: No discharge.      Conjunctiva/sclera: Conjunctivae normal.      Pupils: Pupils are equal, round, and reactive to light.   Neck:      Thyroid: No thyromegaly.   Cardiovascular:      Rate and Rhythm: Normal rate and regular rhythm.      Heart sounds: " Normal heart sounds. No murmur heard.  Pulmonary:      Effort: Pulmonary effort is normal. No respiratory distress.      Breath sounds: Normal breath sounds. No wheezing.   Abdominal:      General: Bowel sounds are normal. There is no distension.      Palpations: Abdomen is soft.      Tenderness: There is no abdominal tenderness.   Musculoskeletal:         General: Normal range of motion.      Cervical back: Normal range of motion and neck supple.   Lymphadenopathy:      Cervical: No cervical adenopathy.   Skin:     General: Skin is warm and dry.   Neurological:      Mental Status: He is alert and oriented to person, place, and time. He is not disoriented.      Sensory: No sensory deficit.      Motor: No weakness.      Coordination: Coordination normal.      Gait: Gait normal.      Deep Tendon Reflexes: Reflexes are normal and symmetric.   Psychiatric:         Speech: Speech normal.         Behavior: Behavior normal.         Thought Content: Thought content normal.         Judgment: Judgment normal.

## 2024-05-24 DIAGNOSIS — F41.9 ANXIETY: Primary | ICD-10-CM

## 2024-05-24 RX ORDER — ESCITALOPRAM OXALATE 10 MG/1
10 TABLET ORAL DAILY
Qty: 90 TABLET | Refills: 3 | Status: SHIPPED | OUTPATIENT
Start: 2024-05-24 | End: 2025-05-19

## 2024-06-03 DIAGNOSIS — F41.9 ANXIETY: Primary | ICD-10-CM

## 2024-06-09 LAB
ALBUMIN SERPL-MCNC: 4.8 G/DL (ref 3.6–5.1)
ALBUMIN/GLOB SERPL: 1.7 (CALC) (ref 1–2.5)
ALP SERPL-CCNC: 60 U/L (ref 36–130)
ALT SERPL-CCNC: 26 U/L (ref 9–46)
AST SERPL-CCNC: 14 U/L (ref 10–40)
BILIRUB SERPL-MCNC: 0.9 MG/DL (ref 0.2–1.2)
BUN SERPL-MCNC: 18 MG/DL (ref 7–25)
BUN/CREAT SERPL: NORMAL (CALC) (ref 6–22)
CALCIUM SERPL-MCNC: 10 MG/DL (ref 8.6–10.3)
CHLORIDE SERPL-SCNC: 102 MMOL/L (ref 98–110)
CHOLEST SERPL-MCNC: 146 MG/DL
CHOLEST/HDLC SERPL: 3.5 (CALC)
CO2 SERPL-SCNC: 28 MMOL/L (ref 20–32)
CREAT SERPL-MCNC: 1.15 MG/DL (ref 0.6–1.26)
GFR/BSA.PRED SERPLBLD CYS-BASED-ARV: 85 ML/MIN/1.73M2
GLOBULIN SER CALC-MCNC: 2.9 G/DL (CALC) (ref 1.9–3.7)
GLUCOSE SERPL-MCNC: 98 MG/DL (ref 65–99)
HDLC SERPL-MCNC: 42 MG/DL
LDLC SERPL CALC-MCNC: 82 MG/DL (CALC)
NONHDLC SERPL-MCNC: 104 MG/DL (CALC)
POTASSIUM SERPL-SCNC: 4.4 MMOL/L (ref 3.5–5.3)
PROT SERPL-MCNC: 7.7 G/DL (ref 6.1–8.1)
SODIUM SERPL-SCNC: 140 MMOL/L (ref 135–146)
TRIGL SERPL-MCNC: 122 MG/DL

## 2024-06-18 ENCOUNTER — TELEPHONE (OUTPATIENT)
Dept: PSYCHIATRY | Facility: CLINIC | Age: 34
End: 2024-06-18

## 2024-06-18 NOTE — TELEPHONE ENCOUNTER
Contacted patient in regards to Routine Referral in attempts to verify patient's needs of services and add patient to proper wait list. Writer left vm to call intake at [phone]    Referral closed due to unable to contact pt.

## 2024-09-23 DIAGNOSIS — K21.00 GASTROESOPHAGEAL REFLUX DISEASE WITH ESOPHAGITIS WITHOUT HEMORRHAGE: ICD-10-CM

## 2024-11-08 DIAGNOSIS — E78.2 MIXED HYPERLIPIDEMIA: ICD-10-CM

## 2024-11-08 RX ORDER — ROSUVASTATIN CALCIUM 5 MG/1
5 TABLET, COATED ORAL DAILY
Qty: 90 TABLET | Refills: 1 | Status: SHIPPED | OUTPATIENT
Start: 2024-11-08

## 2025-01-31 ENCOUNTER — OFFICE VISIT (OUTPATIENT)
Dept: GASTROENTEROLOGY | Facility: CLINIC | Age: 35
End: 2025-01-31
Payer: COMMERCIAL

## 2025-01-31 VITALS
SYSTOLIC BLOOD PRESSURE: 143 MMHG | HEIGHT: 69 IN | DIASTOLIC BLOOD PRESSURE: 82 MMHG | BODY MASS INDEX: 30.81 KG/M2 | WEIGHT: 208 LBS

## 2025-01-31 DIAGNOSIS — R19.7 DIARRHEA, UNSPECIFIED TYPE: Primary | ICD-10-CM

## 2025-01-31 DIAGNOSIS — K21.9 GASTROESOPHAGEAL REFLUX DISEASE, UNSPECIFIED WHETHER ESOPHAGITIS PRESENT: ICD-10-CM

## 2025-01-31 DIAGNOSIS — R10.84 GENERALIZED ABDOMINAL PAIN: ICD-10-CM

## 2025-01-31 PROCEDURE — 99203 OFFICE O/P NEW LOW 30 MIN: CPT | Performed by: PHYSICIAN ASSISTANT

## 2025-01-31 NOTE — PATIENT INSTRUCTIONS
Common food culprits for gas & bloating:  Milk and dairy products (cheese, ice cream, milk)  Vegetables (asparagus, broccoli, [place] sprouts, cabbage, cauliflower, celery, corn, cucumber, kohlrabi, leeks, onions, parsnips, potatoes, radishes, rutabaga, turnips)  Fruits (apples, apricots, bananas, peaches, pears, prunes, raisins)  Whole grains (bagels, bran/bran cereal, pretzels, wheat and oats, wheat germ)  Liquids (beer, carbonated beverages, carbonated medications)  Miscellaneous (beans, artificial sweeteners, chewing gum)    Strategies to relieve gas & bloating:  Avoid common culprit foods as above.  Cut down on fat consumed in your diet.  Cooked veggies may be less bothersome than raw veggies.  Try IB-john, available over the counter. This is an enteric-coated peppermint formulation that is very effective at reducing abdominal discomfort, bloating, and excessive gas.  You may take it as needed when you feel abdominal discomfort/cramping start OR you may take 1-2 tablets per day as more of a maintenance measure to prevent the onset of these bothersome symptoms.  Gas-X, Beano, and Gaviscon are good gas-reducing medications available over the counter. Beano is most effective when taken with a food that you are sensitive to.  Eat smaller, more frequent meals.

## 2025-01-31 NOTE — ASSESSMENT & PLAN NOTE
Chronic, stable on current regimen  Continue omeprazole 20 mg QAM  Orders:    Ambulatory Referral to Gastroenterology

## 2025-01-31 NOTE — PROGRESS NOTES
Name: Wei Cole      : 1990      MRN: [id number]  Encounter Provider: Torrie Lewis PA-C  Encounter Date: 2025   [facility]    :  Assessment & Plan  Diarrhea, unspecified type  Subacute x1 month, started initially with N/V suspicious for norovirus. 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   If no better by 4-5 mos after sx onset, plan for colonoscopy with bx for IBD, microscopic colitis  Orders:    Celiac Disease Comprehensive Panel; Future    Small intestinal bacterial overgrowth    Gastroesophageal reflux disease, unspecified whether esophagitis present  Chronic, stable on current regimen  Continue omeprazole 20 mg QAM  Orders:    Ambulatory Referral to Gastroenterology    Generalized abdominal pain  Plan as noted above  Orders:    Ambulatory Referral to Gastroenterology      Return if symptoms worsen or fail to improve.      History of Present Illness     Chief Complaint   Patient presents with    sick visit    Diarrhea     Throwing up, constant diarrhea everyday, sometimes normal but then later in day major gas and diarrhea       Accompanied by self and history is obtained from self.    Wei Cole is a 34 y.o. year old male with a PMH significant for GERD, HLD, hx/o gallbladder polyp, Bell's palsy, nephrolithiasis who presents for evaluation of epigastric pain, vomiting, diarrhea.    Vomiting/diarrhea  Ongoing x1 month, started also with vomiting  Felt likely norovirus, overall improved  Continuing to have diarrhea daily since then, no assoc abdominal cramping  No significant urgency  BM is normal in AM ? diarrhea picks up later in the day  Sx do not wake him from sleep  Assoc with a lot of gas ? resolves with moving his  "bowels  No changes in diet  No abx in past 3 mos    No heartburn/regurgitation as long as he takes omeprazole daily    24 hr diet recall:  Breakfast: cereal / eggs  Snack:   Lunch:   Snack:   Dinner:   Dessert:   Beverages:   Meals out: rare    Review of Systems   Constitutional:  Negative for appetite change, chills, fever and unexpected weight change.   HENT:  Negative for sore throat, trouble swallowing and voice change.    Respiratory:  Negative for cough and choking.    Cardiovascular:  Negative for chest pain and leg swelling.   Gastrointestinal:  Positive for diarrhea. Negative for abdominal distention, abdominal pain, anal bleeding, blood in stool, constipation, nausea, rectal pain and vomiting.        +excessive gas   Skin:  Negative for pallor and rash.   Neurological:  Positive for headaches. Negative for weakness and light-headedness.   Psychiatric/Behavioral:  Negative for confusion and sleep disturbance. The patient is not nervous/anxious.        GI History:  Previous issues: seen in 2020 by Dr. Gill for GERD; unremarkable EGD  Blood thinners: ASA: no antiplatelet: no anticoagulation: no  NSAID use: ibuprofen 1-2 days/month  Caffeine use: 1-3 cups 1/2 caff coffee/day  Tobacco/nicotine use: former 1 PPD x10 yrs; quit ~2021; quit vape 2023  EtOH use: 1-2 drinks per month  Cannabis use: none    Abdominal Surgical Hx: None  Family Hx: Denies first degree relatives with GI malignancies.  GI procedure Hx:  EGD: 10/21/2020: \"IMPRESSION:  Antral gastritis, biopsies taken for H pylori.  Otherwise normal EGD. Duodenal biopsies taken for celiac.\"  Bx: (-)  Colonoscopy:   GI imaging Hx: U/S abdomen complete 3/1/2024: \"IMPRESSION:  1.  Stable gallbladder polyps requiring no further work-up or follow-up.  2.  Resolved splenomegaly.\"    Objective   /82   Ht 5' 9\" (1.753 m)   Wt 94.3 kg (208 lb)   BMI 30.72 kg/m²     Blood pressure 143/82, height 5' 9\" (1.753 m), weight 94.3 kg (208 lb). Body mass index is " 30.72 kg/m².    Physical Exam  Vitals and nursing note reviewed.   Constitutional:       General: He is not in acute distress.     Appearance: He is not toxic-appearing.   HENT:      Head: Normocephalic and atraumatic.      Mouth/Throat:      Mouth: Mucous membranes are moist.      Pharynx: Oropharynx is clear.   Eyes:      General: No scleral icterus.     Extraocular Movements: Extraocular movements intact.   Neck:      Trachea: Phonation normal.   Cardiovascular:      Rate and Rhythm: Normal rate and regular rhythm.      Heart sounds: No murmur heard.     No friction rub. No gallop.   Pulmonary:      Effort: Pulmonary effort is normal. No respiratory distress.      Breath sounds: No wheezing, rhonchi or rales.   Abdominal:      General: Bowel sounds are normal. There is no distension or abdominal bruit.      Palpations: Abdomen is soft. There is no hepatomegaly or splenomegaly.      Tenderness: There is no abdominal tenderness. There is no guarding or rebound.   Musculoskeletal:      Cervical back: Neck supple.      Comments: Moving all 4 extremities spontaneously   Skin:     General: Skin is warm and dry.      Capillary Refill: Capillary refill takes less than 2 seconds.      Coloration: Skin is not jaundiced or pale.   Neurological:      General: No focal deficit present.      Mental Status: He is alert and oriented to person, place, and time.   Psychiatric:         Behavior: Behavior normal. Behavior is cooperative.         Thought Content: Thought content normal.       I have spent a total time of 25 minutes on 01/31/25 in caring for this patient including Diagnostic results, Prognosis, Risks and benefits of tx options, Instructions for management, Patient and family education, Importance of tx compliance, Risk factor reductions, Impressions, Counseling / Coordination of care, Documenting in the medical record, Reviewing / ordering tests, medicine, procedures  , and Obtaining or reviewing history  .    Patient  expressed understanding and had all questions and concerns addressed.    Torrie Lewis PA-C  01/31/25  12:21 PM    This chart was completed in part utilizing one[employer] speech voice recognition software. Random word insertions, pronoun errors, and incomplete sentences are an occasional consequence of this system due to software limitations, and ambient noise. Any questions or concerns about the content, text, or information contained within the body of this dictation should be directly addressed to the provider for clarification.

## 2025-03-27 DIAGNOSIS — K21.00 GASTROESOPHAGEAL REFLUX DISEASE WITH ESOPHAGITIS WITHOUT HEMORRHAGE: ICD-10-CM

## 2025-03-27 RX ORDER — OMEPRAZOLE 20 MG/1
20 CAPSULE, DELAYED RELEASE ORAL
Qty: 90 CAPSULE | Refills: 1 | Status: SHIPPED | OUTPATIENT
Start: 2025-03-27

## 2025-05-02 DIAGNOSIS — F41.9 ANXIETY: ICD-10-CM

## 2025-05-05 RX ORDER — LORAZEPAM 1 MG/1
TABLET ORAL
Qty: 30 TABLET | Refills: 0 | Status: SHIPPED | OUTPATIENT
Start: 2025-05-05

## 2025-05-26 DIAGNOSIS — F41.9 ANXIETY: ICD-10-CM

## 2025-05-28 RX ORDER — ESCITALOPRAM OXALATE 10 MG/1
10 TABLET ORAL DAILY
Qty: 90 TABLET | Refills: 0 | Status: SHIPPED | OUTPATIENT
Start: 2025-05-28

## 2025-06-23 ENCOUNTER — OFFICE VISIT (OUTPATIENT)
Dept: FAMILY MEDICINE CLINIC | Facility: CLINIC | Age: 35
End: 2025-06-23
Payer: COMMERCIAL

## 2025-06-23 VITALS
WEIGHT: 211.6 LBS | BODY MASS INDEX: 31.25 KG/M2 | OXYGEN SATURATION: 99 % | HEART RATE: 71 BPM | SYSTOLIC BLOOD PRESSURE: 120 MMHG | DIASTOLIC BLOOD PRESSURE: 78 MMHG

## 2025-06-23 DIAGNOSIS — E78.2 MIXED HYPERLIPIDEMIA: ICD-10-CM

## 2025-06-23 DIAGNOSIS — K21.00 GASTROESOPHAGEAL REFLUX DISEASE WITH ESOPHAGITIS WITHOUT HEMORRHAGE: ICD-10-CM

## 2025-06-23 DIAGNOSIS — F41.9 ANXIETY: ICD-10-CM

## 2025-06-23 DIAGNOSIS — Z00.00 WELLNESS EXAMINATION: Primary | ICD-10-CM

## 2025-06-23 PROCEDURE — 99395 PREV VISIT EST AGE 18-39: CPT | Performed by: FAMILY MEDICINE

## 2025-06-23 PROCEDURE — 99214 OFFICE O/P EST MOD 30 MIN: CPT | Performed by: FAMILY MEDICINE

## 2025-06-23 RX ORDER — ESCITALOPRAM OXALATE 10 MG/1
10 TABLET ORAL DAILY
Qty: 90 TABLET | Refills: 3 | Status: SHIPPED | OUTPATIENT
Start: 2025-06-23

## 2025-06-23 RX ORDER — ROSUVASTATIN CALCIUM 5 MG/1
5 TABLET, COATED ORAL DAILY
Qty: 90 TABLET | Refills: 3 | Status: SHIPPED | OUTPATIENT
Start: 2025-06-23

## 2025-06-23 RX ORDER — OMEPRAZOLE 20 MG/1
20 CAPSULE, DELAYED RELEASE ORAL
Qty: 90 CAPSULE | Refills: 3 | Status: SHIPPED | OUTPATIENT
Start: 2025-06-23

## 2025-06-23 RX ORDER — LORAZEPAM 1 MG/1
1 TABLET ORAL EVERY 8 HOURS PRN
Qty: 30 TABLET | Refills: 5 | Status: SHIPPED | OUTPATIENT
Start: 2025-06-23

## 2025-06-23 NOTE — ASSESSMENT & PLAN NOTE
Rare ativan use---stable on lexapro    Orders:    escitalopram (LEXAPRO) 10 mg tablet; Take 1 tablet (10 mg total) by mouth daily    LORazepam (ATIVAN) 1 mg tablet; Take 1 tablet (1 mg total) by mouth every 8 (eight) hours as needed for anxiety

## 2025-06-23 NOTE — ASSESSMENT & PLAN NOTE
Stable crestor---cont same    Orders:    rosuvastatin (CRESTOR) 5 mg tablet; Take 1 tablet (5 mg total) by mouth daily    Comprehensive metabolic panel; Future    Lipid Panel with Direct LDL reflex; Future

## 2025-06-23 NOTE — PROGRESS NOTES
Adult Annual Physical  Name: Wei Cole      : 1990      MRN: [id number]  Encounter Provider: Markel Calixto MD  Encounter Date: 2025   Encounter department: [facility]    :  Assessment & Plan  Anxiety  Rare ativan use---stable on lexapro    Orders:    escitalopram (LEXAPRO) 10 mg tablet; Take 1 tablet (10 mg total) by mouth daily    LORazepam (ATIVAN) 1 mg tablet; Take 1 tablet (1 mg total) by mouth every 8 (eight) hours as needed for anxiety    Gastroesophageal reflux disease with esophagitis without hemorrhage    Orders:    omeprazole (PriLOSEC) 20 mg delayed release capsule; Take 1 capsule (20 mg total) by mouth daily before breakfast    Mixed hyperlipidemia  Stable crestor---cont same    Orders:    rosuvastatin (CRESTOR) 5 mg tablet; Take 1 tablet (5 mg total) by mouth daily    Comprehensive metabolic panel; Future    Lipid Panel with Direct LDL reflex; Future    Wellness examination             Preventive Screenings:    - Prostate cancer screening: screening not indicated     Immunizations:  - Immunizations due: Tdap         History of Present Illness     Adult Annual Physical:  Patient presents for annual physical.     Diet and Physical Activity:  - Diet/Nutrition: well balanced diet.  - Exercise: walking, strength training exercises and 3-4 times a week on average.    Depression Screening:  - PHQ-2 Score: 0    General Health:  - Sleep: sleeps well and 7-8 hours of sleep on average.  - Hearing: normal hearing bilateral ears.  - Vision: no vision problems.  - Dental: regular dental visits and brushes teeth twice daily.    /GYN Health:    - History of STDs: no     Health:  - History of STDs: no.     Advanced Care Planning:  - Has an advanced directive?: no    - Has a durable medical POA?: no      Review of Systems   Constitutional:  Negative for fatigue, fever and unexpected weight change.   HENT:  Negative for congestion, sinus pain and sore throat.     Eyes:  Negative for visual disturbance.   Respiratory:  Negative for shortness of breath and wheezing.    Cardiovascular:  Negative for chest pain and palpitations.   Gastrointestinal:  Negative for abdominal pain, nausea and vomiting.   Musculoskeletal: Negative.  Negative for arthralgias and myalgias.   Neurological:  Negative for syncope, weakness and numbness.   Psychiatric/Behavioral: Negative.  Negative for confusion, dysphoric mood and suicidal ideas.          Objective   /78 (BP Location: Left arm, Patient Position: Sitting, Cuff Size: Standard)   Pulse 71   Wt 96 kg (211 lb 9.6 oz)   SpO2 99%   BMI 31.25 kg/m²     Physical Exam  Vitals and nursing note reviewed.   Constitutional:       General: He is not in acute distress.     Appearance: He is well-developed.   HENT:      Head: Normocephalic and atraumatic.     Eyes:      Conjunctiva/sclera: Conjunctivae normal.       Cardiovascular:      Rate and Rhythm: Normal rate and regular rhythm.      Heart sounds: No murmur heard.  Pulmonary:      Effort: Pulmonary effort is normal. No respiratory distress.      Breath sounds: Normal breath sounds.   Abdominal:      Palpations: Abdomen is soft.      Tenderness: There is no abdominal tenderness.     Musculoskeletal:         General: No swelling.      Cervical back: Neck supple.     Skin:     General: Skin is warm and dry.      Capillary Refill: Capillary refill takes less than 2 seconds.     Neurological:      Mental Status: He is alert.     Psychiatric:         Mood and Affect: Mood normal.

## 2025-07-15 LAB
ALBUMIN SERPL-MCNC: 4.9 G/DL (ref 3.6–5.1)
ALBUMIN/GLOB SERPL: 1.6 (CALC) (ref 1–2.5)
ALP SERPL-CCNC: 57 U/L (ref 36–130)
ALT SERPL-CCNC: 26 U/L (ref 9–46)
AST SERPL-CCNC: 17 U/L (ref 10–40)
BILIRUB SERPL-MCNC: 0.9 MG/DL (ref 0.2–1.2)
BUN SERPL-MCNC: 18 MG/DL (ref 7–25)
BUN/CREAT SERPL: NORMAL (CALC) (ref 6–22)
CALCIUM SERPL-MCNC: 10.2 MG/DL (ref 8.6–10.3)
CHLORIDE SERPL-SCNC: 100 MMOL/L (ref 98–110)
CHOLEST SERPL-MCNC: 184 MG/DL
CHOLEST/HDLC SERPL: 4.6 (CALC)
CO2 SERPL-SCNC: 29 MMOL/L (ref 20–32)
CREAT SERPL-MCNC: 1.14 MG/DL (ref 0.6–1.26)
GFR/BSA.PRED SERPLBLD CYS-BASED-ARV: 85 ML/MIN/1.73M2
GLOBULIN SER CALC-MCNC: 3.1 G/DL (CALC) (ref 1.9–3.7)
GLUCOSE SERPL-MCNC: 97 MG/DL (ref 65–99)
HDLC SERPL-MCNC: 40 MG/DL
IGA SERPL-MCNC: 184 MG/DL (ref 47–310)
LDLC SERPL CALC-MCNC: 112 MG/DL (CALC)
NONHDLC SERPL-MCNC: 144 MG/DL (CALC)
POTASSIUM SERPL-SCNC: 4.3 MMOL/L (ref 3.5–5.3)
PROT SERPL-MCNC: 8 G/DL (ref 6.1–8.1)
SODIUM SERPL-SCNC: 138 MMOL/L (ref 135–146)
TRIGL SERPL-MCNC: 202 MG/DL
TTG IGA SER-ACNC: <1 U/ML